# Patient Record
Sex: MALE | Race: WHITE | Employment: OTHER | URBAN - METROPOLITAN AREA
[De-identification: names, ages, dates, MRNs, and addresses within clinical notes are randomized per-mention and may not be internally consistent; named-entity substitution may affect disease eponyms.]

---

## 2022-11-10 ENCOUNTER — HOSPITAL ENCOUNTER (EMERGENCY)
Age: 72
Discharge: HOME | End: 2022-11-10
Payer: MEDICARE

## 2022-11-10 ENCOUNTER — APPOINTMENT (OUTPATIENT)
Dept: GENERAL RADIOLOGY | Age: 72
End: 2022-11-10
Payer: MEDICARE

## 2022-11-10 VITALS
WEIGHT: 295 LBS | SYSTOLIC BLOOD PRESSURE: 147 MMHG | RESPIRATION RATE: 20 BRPM | HEIGHT: 73 IN | HEART RATE: 100 BPM | BODY MASS INDEX: 39.1 KG/M2 | OXYGEN SATURATION: 99 % | DIASTOLIC BLOOD PRESSURE: 90 MMHG | TEMPERATURE: 98.3 F

## 2022-11-10 DIAGNOSIS — J20.9 ACUTE BRONCHITIS, UNSPECIFIED ORGANISM: Primary | ICD-10-CM

## 2022-11-10 DIAGNOSIS — S46.911A SHOULDER STRAIN, RIGHT, INITIAL ENCOUNTER: ICD-10-CM

## 2022-11-10 LAB
INFLUENZA A BY PCR: NOT DETECTED
INFLUENZA B BY PCR: NOT DETECTED

## 2022-11-10 PROCEDURE — 71046 X-RAY EXAM CHEST 2 VIEWS: CPT

## 2022-11-10 PROCEDURE — 87502 INFLUENZA DNA AMP PROBE: CPT

## 2022-11-10 PROCEDURE — 73030 X-RAY EXAM OF SHOULDER: CPT

## 2022-11-10 PROCEDURE — 99211 OFF/OP EST MAY X REQ PHY/QHP: CPT

## 2022-11-10 RX ORDER — OMEPRAZOLE 20 MG/1
20 CAPSULE, DELAYED RELEASE ORAL DAILY
COMMUNITY

## 2022-11-10 RX ORDER — PROPRANOLOL HYDROCHLORIDE 10 MG/1
10 TABLET ORAL 3 TIMES DAILY
COMMUNITY

## 2022-11-10 RX ORDER — TAMSULOSIN HYDROCHLORIDE 0.4 MG/1
0.4 CAPSULE ORAL DAILY
COMMUNITY

## 2022-11-10 RX ORDER — ATORVASTATIN CALCIUM 10 MG/1
10 TABLET, FILM COATED ORAL DAILY
COMMUNITY

## 2022-11-10 RX ORDER — TIZANIDINE 2 MG/1
2 TABLET ORAL EVERY 6 HOURS PRN
COMMUNITY

## 2022-11-10 RX ORDER — HYDROCHLOROTHIAZIDE 12.5 MG/1
CAPSULE, GELATIN COATED ORAL DAILY
COMMUNITY

## 2022-11-10 ASSESSMENT — PAIN DESCRIPTION - LOCATION: LOCATION: SHOULDER

## 2022-11-10 ASSESSMENT — PAIN DESCRIPTION - DESCRIPTORS: DESCRIPTORS: ACHING;DISCOMFORT;TENDER

## 2022-11-10 ASSESSMENT — PAIN DESCRIPTION - ORIENTATION: ORIENTATION: RIGHT

## 2022-11-10 ASSESSMENT — PAIN - FUNCTIONAL ASSESSMENT: PAIN_FUNCTIONAL_ASSESSMENT: 0-10

## 2022-11-10 ASSESSMENT — PAIN SCALES - GENERAL: PAINLEVEL_OUTOF10: 9

## 2022-11-10 NOTE — ED PROVIDER NOTES
[address] [facility]  Department of Emergency Medicine  UC Encounter Note  11/10/22   1:04 PM EST      NAME: Marina Sarabia  :  1950  MRN:  [id number]    Chief Complaint: Cough (States he was coughing so bad on Tuesday that he \"blacked out\" and hit his shoulder) and Shoulder Injury (Right shoulder )      This is a 58-year-old male the presents to urgent care complaining of persistent cough and upper respiratory symptoms for the past couple days. Also, he states several days ago he was coughing so much that he blacked out for a second and fell and hit his right shoulder. He denies any head or neck pain. He states he does not feel dizzy or lightheaded anymore he said it was just a lot of coughing at that 1 point. He denies any abdominal pain nausea vomiting diarrhea or urinary symptoms. On first contact patient he appears to be no acute distress. Review of Systems  Pertinent positives and negatives are stated within HPI, all other systems reviewed and are negative. Physical Exam  Vitals and nursing note reviewed. Constitutional:       Appearance: He is well-developed. HENT:      Head: Normocephalic and atraumatic. Jaw: There is normal jaw occlusion. No trismus. Right Ear: Hearing, ear canal and external ear normal. Tympanic membrane is bulging. Left Ear: Hearing, ear canal and external ear normal. Tympanic membrane is bulging. Nose: Congestion and rhinorrhea present. Right Sinus: No maxillary sinus tenderness or frontal sinus tenderness. Left Sinus: No maxillary sinus tenderness or frontal sinus tenderness. Mouth/Throat:      Pharynx: Uvula midline. No uvula swelling. Eyes:      General: Lids are normal.      Conjunctiva/sclera: Conjunctivae normal.      Pupils: Pupils are equal, round, and reactive to light. Cardiovascular:      Rate and Rhythm: Normal rate and regular rhythm. Heart sounds: Normal heart sounds.  No murmur heard.  Pulmonary:      Effort: Pulmonary effort is normal.      Breath sounds: Normal breath sounds. Chest:      Chest wall: Tenderness present. Abdominal:      General: Bowel sounds are normal.      Palpations: Abdomen is soft. Abdomen is not rigid. Tenderness: There is no abdominal tenderness. There is no guarding or rebound. Musculoskeletal:      Cervical back: Normal range of motion and neck supple. Comments: Patient does have some right shoulder area tenderness to palpation but no bruise no obvious deformity. He does have some mild right clavicle area tenderness to we will get that on the x-ray of the chest.   Skin:     General: Skin is warm and dry. Findings: No abrasion or rash. Neurological:      General: No focal deficit present. Mental Status: He is alert and oriented to person, place, and time. GCS: GCS eye subscore is 4. GCS verbal subscore is 5. GCS motor subscore is 6. Cranial Nerves: Cranial nerves 2-12 are intact. No cranial nerve deficit. Sensory: No sensory deficit. Motor: Motor function is intact. Coordination: Coordination is intact. Coordination normal.      Gait: Gait is intact. Gait normal.       Procedures    MDM  Number of Diagnoses or Management Options  Acute bronchitis, unspecified organism  Shoulder strain, right, initial encounter  Diagnosis management comments: Patient is in no acute distress. He is neurologically intact. Lungs are clear. X-rays were negative. He does have some cough and URI symptoms. Bronchitis symptoms. Shoulder strain. I did speak to him verbally and gave him verbal discharge instructions since our power did go out. I placed him on doxycycline and cough and cold medications. Instructions given verbally given.   I told him he can also look on [facility] to get his discharge information from there as well.             --------------------------------------------- PAST HISTORY ---------------------------------------------  Past Medical History:  has no past medical history on file. Past Surgical History:  has no past surgical history on file. Social History:  reports that he has never smoked. He has never used smokeless tobacco.    Family History: family history is not on file. The patients home medications have been reviewed. Allergies: Ace inhibitors    -------------------------------------------------- RESULTS -------------------------------------------------  No results found for this visit on 11/10/22. XR SHOULDER RIGHT (MIN 2 VIEWS)   Final Result   No acute abnormality. XR CHEST (2 VW)   Final Result   No acute process. ------------------------- NURSING NOTES AND VITALS REVIEWED ---------------------------   The nursing notes within the ED encounter and vital signs as below have been reviewed. BP (!) 147/90   Pulse 100   Temp 98.3 °F (36.8 °C)   Resp 20   Ht 6' 1\" (1.854 m)   Wt 295 lb (133.8 kg)   SpO2 99%   BMI 38.92 kg/m²   Oxygen Saturation Interpretation: Normal      ------------------------------------------ PROGRESS NOTES ------------------------------------------   I have spoken with the patient and discussed todays results, in addition to providing specific details for the plan of care and counseling regarding the diagnosis and prognosis. Their questions are answered at this time and they are agreeable with the plan.      --------------------------------- ADDITIONAL PROVIDER NOTES ---------------------------------     This patient is stable for discharge. I have shared the specific conditions for return, as well as the importance of follow-up. * NOTE: This report was transcribed using voice recognition software.  Every effort was made to ensure accuracy; however, inadvertent computerized transcription errors may be present.    --------------------------------- IMPRESSION AND DISPOSITION ---------------------------------    IMPRESSION  1. Acute bronchitis, unspecified organism    2.  Shoulder strain, right, initial encounter        DISPOSITION  Disposition: Discharge to home  Patient condition is good       Valentine Rodriguez PA-C  11/10/22 1552

## 2025-01-01 ENCOUNTER — HOSPITAL ENCOUNTER (INPATIENT)
Age: 75
LOS: 2 days | Discharge: HOME | DRG: 660 | End: 2025-01-03
Attending: HOSPITALIST | Admitting: HOSPITALIST
Payer: MEDICARE

## 2025-01-01 DIAGNOSIS — N20.0 BILATERAL KIDNEY STONES: ICD-10-CM

## 2025-01-01 PROBLEM — N30.01 ACUTE CYSTITIS WITH HEMATURIA: Status: ACTIVE | Noted: 2025-01-01

## 2025-01-01 PROBLEM — E66.813 CLASS 3 SEVERE OBESITY WITH SERIOUS COMORBIDITY AND BODY MASS INDEX (BMI) OF 40.0 TO 44.9 IN ADULT: Status: ACTIVE | Noted: 2025-01-01

## 2025-01-01 PROBLEM — E66.01 CLASS 3 SEVERE OBESITY WITH SERIOUS COMORBIDITY AND BODY MASS INDEX (BMI) OF 40.0 TO 44.9 IN ADULT: Status: ACTIVE | Noted: 2025-01-01

## 2025-01-01 PROBLEM — I10 PRIMARY HYPERTENSION: Status: ACTIVE | Noted: 2025-01-01

## 2025-01-01 PROBLEM — E78.2 MIXED HYPERLIPIDEMIA: Status: ACTIVE | Noted: 2025-01-01

## 2025-01-01 PROBLEM — K21.9 GASTROESOPHAGEAL REFLUX DISEASE WITHOUT ESOPHAGITIS: Status: ACTIVE | Noted: 2025-01-01

## 2025-01-01 LAB
ANION GAP SERPL CALCULATED.3IONS-SCNC: 11 MMOL/L (ref 7–16)
BUN SERPL-MCNC: 17 MG/DL (ref 6–23)
CALCIUM SERPL-MCNC: 9.5 MG/DL (ref 8.6–10.2)
CHLORIDE SERPL-SCNC: 97 MMOL/L (ref 98–107)
CO2 SERPL-SCNC: 24 MMOL/L (ref 22–29)
CREAT SERPL-MCNC: 1 MG/DL (ref 0.7–1.2)
GFR, ESTIMATED: 81 ML/MIN/1.73M2
GLUCOSE SERPL-MCNC: 164 MG/DL (ref 74–99)
POTASSIUM SERPL-SCNC: 4.5 MMOL/L (ref 3.5–5)
SODIUM SERPL-SCNC: 132 MMOL/L (ref 132–146)

## 2025-01-01 PROCEDURE — 6370000000 HC RX 637 (ALT 250 FOR IP): Performed by: HOSPITALIST

## 2025-01-01 PROCEDURE — 36415 COLL VENOUS BLD VENIPUNCTURE: CPT

## 2025-01-01 PROCEDURE — 99223 1ST HOSP IP/OBS HIGH 75: CPT | Performed by: HOSPITALIST

## 2025-01-01 PROCEDURE — 2580000003 HC RX 258: Performed by: HOSPITALIST

## 2025-01-01 PROCEDURE — 80048 BASIC METABOLIC PNL TOTAL CA: CPT

## 2025-01-01 PROCEDURE — 1200000000 HC SEMI PRIVATE

## 2025-01-01 RX ORDER — DULOXETIN HYDROCHLORIDE 30 MG/1
30 CAPSULE, DELAYED RELEASE ORAL DAILY
Status: DISCONTINUED | OUTPATIENT
Start: 2025-01-02 | End: 2025-01-01

## 2025-01-01 RX ORDER — ACETAMINOPHEN 325 MG/1
650 TABLET ORAL EVERY 6 HOURS PRN
Status: DISCONTINUED | OUTPATIENT
Start: 2025-01-01 | End: 2025-01-03 | Stop reason: HOSPADM

## 2025-01-01 RX ORDER — PREGABALIN 75 MG/1
150 CAPSULE ORAL 2 TIMES DAILY
Status: DISCONTINUED | OUTPATIENT
Start: 2025-01-01 | End: 2025-01-03 | Stop reason: HOSPADM

## 2025-01-01 RX ORDER — DULOXETIN HYDROCHLORIDE 60 MG/1
60 CAPSULE, DELAYED RELEASE ORAL 2 TIMES DAILY
Status: DISCONTINUED | OUTPATIENT
Start: 2025-01-01 | End: 2025-01-03 | Stop reason: HOSPADM

## 2025-01-01 RX ORDER — ONDANSETRON 2 MG/ML
4 INJECTION INTRAMUSCULAR; INTRAVENOUS EVERY 6 HOURS PRN
Status: DISCONTINUED | OUTPATIENT
Start: 2025-01-01 | End: 2025-01-03 | Stop reason: HOSPADM

## 2025-01-01 RX ORDER — PRIMIDONE 50 MG/1
100 TABLET ORAL NIGHTLY
Status: DISCONTINUED | OUTPATIENT
Start: 2025-01-01 | End: 2025-01-01

## 2025-01-01 RX ORDER — OMEGA-3-ACID ETHYL ESTERS 1 G/1
1 CAPSULE, LIQUID FILLED ORAL 2 TIMES DAILY
Status: DISCONTINUED | OUTPATIENT
Start: 2025-01-01 | End: 2025-01-03 | Stop reason: HOSPADM

## 2025-01-01 RX ORDER — AMLODIPINE BESYLATE 10 MG/1
10 TABLET ORAL DAILY
Status: DISCONTINUED | OUTPATIENT
Start: 2025-01-02 | End: 2025-01-03 | Stop reason: HOSPADM

## 2025-01-01 RX ORDER — SODIUM CHLORIDE 0.9 % (FLUSH) 0.9 %
5-40 SYRINGE (ML) INJECTION EVERY 12 HOURS SCHEDULED
Status: DISCONTINUED | OUTPATIENT
Start: 2025-01-01 | End: 2025-01-03 | Stop reason: HOSPADM

## 2025-01-01 RX ORDER — HYDROMORPHONE HYDROCHLORIDE 1 MG/ML
1 INJECTION, SOLUTION INTRAMUSCULAR; INTRAVENOUS; SUBCUTANEOUS EVERY 4 HOURS PRN
Status: DISCONTINUED | OUTPATIENT
Start: 2025-01-01 | End: 2025-01-03 | Stop reason: HOSPADM

## 2025-01-01 RX ORDER — DULOXETIN HYDROCHLORIDE 30 MG/1
60 CAPSULE, DELAYED RELEASE ORAL 2 TIMES DAILY
COMMUNITY

## 2025-01-01 RX ORDER — ACETAMINOPHEN 650 MG/1
650 SUPPOSITORY RECTAL EVERY 6 HOURS PRN
Status: DISCONTINUED | OUTPATIENT
Start: 2025-01-01 | End: 2025-01-03 | Stop reason: HOSPADM

## 2025-01-01 RX ORDER — DAPAGLIFLOZIN 10 MG/1
10 TABLET, FILM COATED ORAL EVERY MORNING
Status: ON HOLD | COMMUNITY
End: 2025-01-01

## 2025-01-01 RX ORDER — TRAZODONE HYDROCHLORIDE 100 MG/1
100 TABLET ORAL NIGHTLY
COMMUNITY

## 2025-01-01 RX ORDER — POTASSIUM CHLORIDE 7.45 MG/ML
10 INJECTION INTRAVENOUS PRN
Status: DISCONTINUED | OUTPATIENT
Start: 2025-01-01 | End: 2025-01-03 | Stop reason: HOSPADM

## 2025-01-01 RX ORDER — SODIUM CHLORIDE 9 MG/ML
INJECTION, SOLUTION INTRAVENOUS CONTINUOUS
Status: ACTIVE | OUTPATIENT
Start: 2025-01-01 | End: 2025-01-02

## 2025-01-01 RX ORDER — ROSUVASTATIN CALCIUM 20 MG/1
40 TABLET, COATED ORAL DAILY
Status: DISCONTINUED | OUTPATIENT
Start: 2025-01-02 | End: 2025-01-03 | Stop reason: HOSPADM

## 2025-01-01 RX ORDER — PREGABALIN 100 MG/1
100 CAPSULE ORAL DAILY
COMMUNITY

## 2025-01-01 RX ORDER — POLYETHYLENE GLYCOL 3350 17 G/17G
17 POWDER, FOR SOLUTION ORAL DAILY PRN
Status: DISCONTINUED | OUTPATIENT
Start: 2025-01-01 | End: 2025-01-03 | Stop reason: HOSPADM

## 2025-01-01 RX ORDER — HYDROCHLOROTHIAZIDE 25 MG/1
25 TABLET ORAL DAILY
Status: DISCONTINUED | OUTPATIENT
Start: 2025-01-02 | End: 2025-01-03 | Stop reason: HOSPADM

## 2025-01-01 RX ORDER — PRIMIDONE 50 MG/1
50 TABLET ORAL NIGHTLY
COMMUNITY

## 2025-01-01 RX ORDER — OXYCODONE HYDROCHLORIDE 5 MG/1
5 TABLET ORAL EVERY 4 HOURS PRN
Status: DISCONTINUED | OUTPATIENT
Start: 2025-01-01 | End: 2025-01-03 | Stop reason: HOSPADM

## 2025-01-01 RX ORDER — TIRZEPATIDE 15 MG/.5ML
15 INJECTION, SOLUTION SUBCUTANEOUS WEEKLY
COMMUNITY

## 2025-01-01 RX ORDER — INSULIN LISPRO 100 [IU]/ML
0-4 INJECTION, SOLUTION INTRAVENOUS; SUBCUTANEOUS
Status: DISCONTINUED | OUTPATIENT
Start: 2025-01-01 | End: 2025-01-03 | Stop reason: HOSPADM

## 2025-01-01 RX ORDER — TADALAFIL 5 MG/1
5 TABLET ORAL DAILY
Status: DISCONTINUED | OUTPATIENT
Start: 2025-01-02 | End: 2025-01-03 | Stop reason: HOSPADM

## 2025-01-01 RX ORDER — ASPIRIN 81 MG/1
81 TABLET, CHEWABLE ORAL DAILY
COMMUNITY

## 2025-01-01 RX ORDER — MAGNESIUM SULFATE IN WATER 40 MG/ML
2000 INJECTION, SOLUTION INTRAVENOUS PRN
Status: DISCONTINUED | OUTPATIENT
Start: 2025-01-01 | End: 2025-01-03 | Stop reason: HOSPADM

## 2025-01-01 RX ORDER — TADALAFIL 5 MG/1
5 TABLET ORAL DAILY
COMMUNITY

## 2025-01-01 RX ORDER — TAMSULOSIN HYDROCHLORIDE 0.4 MG/1
0.4 CAPSULE ORAL DAILY
Status: DISCONTINUED | OUTPATIENT
Start: 2025-01-02 | End: 2025-01-03 | Stop reason: HOSPADM

## 2025-01-01 RX ORDER — GLUCAGON 1 MG/ML
1 KIT INJECTION PRN
Status: DISCONTINUED | OUTPATIENT
Start: 2025-01-01 | End: 2025-01-03 | Stop reason: HOSPADM

## 2025-01-01 RX ORDER — DIPHENHYDRAMINE HCL 25 MG
25 CAPSULE ORAL NIGHTLY PRN
COMMUNITY

## 2025-01-01 RX ORDER — SODIUM CHLORIDE 0.9 % (FLUSH) 0.9 %
5-40 SYRINGE (ML) INJECTION PRN
Status: DISCONTINUED | OUTPATIENT
Start: 2025-01-01 | End: 2025-01-03 | Stop reason: HOSPADM

## 2025-01-01 RX ORDER — PREGABALIN 100 MG/1
100 CAPSULE ORAL DAILY
Status: DISCONTINUED | OUTPATIENT
Start: 2025-01-02 | End: 2025-01-01

## 2025-01-01 RX ORDER — ASPIRIN 81 MG/1
81 TABLET, CHEWABLE ORAL DAILY
Status: DISCONTINUED | OUTPATIENT
Start: 2025-01-02 | End: 2025-01-03 | Stop reason: HOSPADM

## 2025-01-01 RX ORDER — OXYCODONE AND ACETAMINOPHEN 10; 325 MG/1; MG/1
1 TABLET ORAL EVERY 6 HOURS PRN
COMMUNITY

## 2025-01-01 RX ORDER — PANTOPRAZOLE SODIUM 40 MG/1
40 TABLET, DELAYED RELEASE ORAL
Status: DISCONTINUED | OUTPATIENT
Start: 2025-01-02 | End: 2025-01-03 | Stop reason: HOSPADM

## 2025-01-01 RX ORDER — ONDANSETRON 4 MG/1
4 TABLET, ORALLY DISINTEGRATING ORAL EVERY 8 HOURS PRN
Status: DISCONTINUED | OUTPATIENT
Start: 2025-01-01 | End: 2025-01-03 | Stop reason: HOSPADM

## 2025-01-01 RX ORDER — TRAZODONE HYDROCHLORIDE 50 MG/1
100 TABLET, FILM COATED ORAL NIGHTLY PRN
Status: DISCONTINUED | OUTPATIENT
Start: 2025-01-01 | End: 2025-01-03 | Stop reason: HOSPADM

## 2025-01-01 RX ORDER — IBUPROFEN 800 MG/1
800 TABLET, FILM COATED ORAL EVERY 8 HOURS PRN
COMMUNITY

## 2025-01-01 RX ORDER — PRIMIDONE 50 MG/1
150 TABLET ORAL NIGHTLY
Status: DISCONTINUED | OUTPATIENT
Start: 2025-01-02 | End: 2025-01-03 | Stop reason: HOSPADM

## 2025-01-01 RX ORDER — POTASSIUM CHLORIDE 1500 MG/1
40 TABLET, EXTENDED RELEASE ORAL PRN
Status: DISCONTINUED | OUTPATIENT
Start: 2025-01-01 | End: 2025-01-03 | Stop reason: HOSPADM

## 2025-01-01 RX ORDER — ICOSAPENT ETHYL 1 G/1
2 CAPSULE ORAL 2 TIMES DAILY
Status: ON HOLD | COMMUNITY
End: 2025-01-01

## 2025-01-01 RX ORDER — SODIUM CHLORIDE 9 MG/ML
INJECTION, SOLUTION INTRAVENOUS PRN
Status: DISCONTINUED | OUTPATIENT
Start: 2025-01-01 | End: 2025-01-03 | Stop reason: HOSPADM

## 2025-01-01 RX ORDER — DEXTROSE MONOHYDRATE 100 MG/ML
INJECTION, SOLUTION INTRAVENOUS CONTINUOUS PRN
Status: DISCONTINUED | OUTPATIENT
Start: 2025-01-01 | End: 2025-01-03 | Stop reason: HOSPADM

## 2025-01-01 RX ADMIN — ACETAMINOPHEN 650 MG: 325 TABLET ORAL at 23:50

## 2025-01-01 RX ADMIN — SODIUM CHLORIDE: 9 INJECTION, SOLUTION INTRAVENOUS at 23:50

## 2025-01-01 ASSESSMENT — PAIN SCALES - GENERAL: PAINLEVEL_OUTOF10: 0

## 2025-01-02 ENCOUNTER — ANESTHESIA EVENT (OUTPATIENT)
Dept: OPERATING ROOM | Age: 75
DRG: 660 | End: 2025-01-02
Payer: MEDICARE

## 2025-01-02 ENCOUNTER — APPOINTMENT (OUTPATIENT)
Dept: GENERAL RADIOLOGY | Age: 75
DRG: 660 | End: 2025-01-02
Attending: HOSPITALIST
Payer: MEDICARE

## 2025-01-02 ENCOUNTER — ANESTHESIA (OUTPATIENT)
Dept: OPERATING ROOM | Age: 75
DRG: 660 | End: 2025-01-02
Payer: MEDICARE

## 2025-01-02 LAB
25(OH)D3 SERPL-MCNC: 21.6 NG/ML (ref 30–100)
ANION GAP SERPL CALCULATED.3IONS-SCNC: 11 MMOL/L (ref 7–16)
BACTERIA URNS QL MICRO: ABNORMAL
BASOPHILS # BLD: 0.05 K/UL (ref 0–0.2)
BASOPHILS NFR BLD: 0 % (ref 0–2)
BILIRUB UR QL STRIP: NEGATIVE
BNP SERPL-MCNC: 409 PG/ML (ref 0–125)
BUN SERPL-MCNC: 16 MG/DL (ref 6–23)
CALCIUM SERPL-MCNC: 9.8 MG/DL (ref 8.6–10.2)
CHLORIDE SERPL-SCNC: 102 MMOL/L (ref 98–107)
CLARITY UR: ABNORMAL
CO2 SERPL-SCNC: 27 MMOL/L (ref 22–29)
COLOR UR: YELLOW
CREAT SERPL-MCNC: 1.1 MG/DL (ref 0.7–1.2)
EOSINOPHIL # BLD: 0.04 K/UL (ref 0.05–0.5)
EOSINOPHILS RELATIVE PERCENT: 0 % (ref 0–6)
ERYTHROCYTE [DISTWIDTH] IN BLOOD BY AUTOMATED COUNT: 14.3 % (ref 11.5–15)
GFR, ESTIMATED: 72 ML/MIN/1.73M2
GLUCOSE BLD-MCNC: 167 MG/DL (ref 74–99)
GLUCOSE BLD-MCNC: 181 MG/DL (ref 74–99)
GLUCOSE BLD-MCNC: 200 MG/DL (ref 74–99)
GLUCOSE BLD-MCNC: 236 MG/DL (ref 74–99)
GLUCOSE BLD-MCNC: 257 MG/DL (ref 74–99)
GLUCOSE BLD-MCNC: 294 MG/DL (ref 74–99)
GLUCOSE SERPL-MCNC: 172 MG/DL (ref 74–99)
GLUCOSE UR STRIP-MCNC: NEGATIVE MG/DL
HBA1C MFR BLD: 6.8 % (ref 4–5.6)
HCT VFR BLD AUTO: 41.7 % (ref 37–54)
HGB BLD-MCNC: 14 G/DL (ref 12.5–16.5)
HGB UR QL STRIP.AUTO: ABNORMAL
IMM GRANULOCYTES # BLD AUTO: 0.13 K/UL (ref 0–0.58)
IMM GRANULOCYTES NFR BLD: 1 % (ref 0–5)
KETONES UR STRIP-MCNC: NEGATIVE MG/DL
LACTATE BLDV-SCNC: 2.4 MMOL/L (ref 0.5–2.2)
LEUKOCYTE ESTERASE UR QL STRIP: ABNORMAL
LYMPHOCYTES NFR BLD: 1.83 K/UL (ref 1.5–4)
LYMPHOCYTES RELATIVE PERCENT: 13 % (ref 20–42)
MCH RBC QN AUTO: 30.8 PG (ref 26–35)
MCHC RBC AUTO-ENTMCNC: 33.6 G/DL (ref 32–34.5)
MCV RBC AUTO: 91.6 FL (ref 80–99.9)
MONOCYTES NFR BLD: 1.39 K/UL (ref 0.1–0.95)
MONOCYTES NFR BLD: 10 % (ref 2–12)
NEUTROPHILS NFR BLD: 75 % (ref 43–80)
NEUTS SEG NFR BLD: 10.52 K/UL (ref 1.8–7.3)
NITRITE UR QL STRIP: POSITIVE
PH UR STRIP: 6 [PH] (ref 5–9)
PLATELET # BLD AUTO: 203 K/UL (ref 130–450)
PMV BLD AUTO: 8.5 FL (ref 7–12)
POTASSIUM SERPL-SCNC: 4.8 MMOL/L (ref 3.5–5)
PROT UR STRIP-MCNC: 30 MG/DL
PTH-INTACT SERPL-MCNC: 27.9 PG/ML (ref 15–65)
RBC # BLD AUTO: 4.55 M/UL (ref 3.8–5.8)
RBC #/AREA URNS HPF: ABNORMAL /HPF
SODIUM SERPL-SCNC: 140 MMOL/L (ref 132–146)
SP GR UR STRIP: 1.02 (ref 1–1.03)
TSH SERPL DL<=0.05 MIU/L-ACNC: 0.46 UIU/ML (ref 0.27–4.2)
UROBILINOGEN UR STRIP-ACNC: 0.2 EU/DL (ref 0–1)
WBC #/AREA URNS HPF: ABNORMAL /HPF
WBC OTHER # BLD: 14 K/UL (ref 4.5–11.5)

## 2025-01-02 PROCEDURE — 80048 BASIC METABOLIC PNL TOTAL CA: CPT

## 2025-01-02 PROCEDURE — 83605 ASSAY OF LACTIC ACID: CPT

## 2025-01-02 PROCEDURE — 7100000000 HC PACU RECOVERY - FIRST 15 MIN: Performed by: UROLOGY

## 2025-01-02 PROCEDURE — 85025 COMPLETE CBC W/AUTO DIFF WBC: CPT

## 2025-01-02 PROCEDURE — 6370000000 HC RX 637 (ALT 250 FOR IP): Performed by: HOSPITALIST

## 2025-01-02 PROCEDURE — 2709999900 HC NON-CHARGEABLE SUPPLY: Performed by: UROLOGY

## 2025-01-02 PROCEDURE — 97530 THERAPEUTIC ACTIVITIES: CPT

## 2025-01-02 PROCEDURE — 82962 GLUCOSE BLOOD TEST: CPT

## 2025-01-02 PROCEDURE — 7100000001 HC PACU RECOVERY - ADDTL 15 MIN: Performed by: UROLOGY

## 2025-01-02 PROCEDURE — 99232 SBSQ HOSP IP/OBS MODERATE 35: CPT | Performed by: STUDENT IN AN ORGANIZED HEALTH CARE EDUCATION/TRAINING PROGRAM

## 2025-01-02 PROCEDURE — 3700000000 HC ANESTHESIA ATTENDED CARE: Performed by: UROLOGY

## 2025-01-02 PROCEDURE — C1769 GUIDE WIRE: HCPCS | Performed by: UROLOGY

## 2025-01-02 PROCEDURE — 3600000003 HC SURGERY LEVEL 3 BASE: Performed by: UROLOGY

## 2025-01-02 PROCEDURE — 97165 OT EVAL LOW COMPLEX 30 MIN: CPT

## 2025-01-02 PROCEDURE — 87086 URINE CULTURE/COLONY COUNT: CPT

## 2025-01-02 PROCEDURE — 83036 HEMOGLOBIN GLYCOSYLATED A1C: CPT

## 2025-01-02 PROCEDURE — 83880 ASSAY OF NATRIURETIC PEPTIDE: CPT

## 2025-01-02 PROCEDURE — BT141ZZ FLUOROSCOPY OF KIDNEYS, URETERS AND BLADDER USING LOW OSMOLAR CONTRAST: ICD-10-PCS | Performed by: UROLOGY

## 2025-01-02 PROCEDURE — 2580000003 HC RX 258: Performed by: UROLOGY

## 2025-01-02 PROCEDURE — 1200000000 HC SEMI PRIVATE

## 2025-01-02 PROCEDURE — 97535 SELF CARE MNGMENT TRAINING: CPT

## 2025-01-02 PROCEDURE — 83970 ASSAY OF PARATHORMONE: CPT

## 2025-01-02 PROCEDURE — C1758 CATHETER, URETERAL: HCPCS | Performed by: UROLOGY

## 2025-01-02 PROCEDURE — 6360000002 HC RX W HCPCS: Performed by: HOSPITALIST

## 2025-01-02 PROCEDURE — 3600000013 HC SURGERY LEVEL 3 ADDTL 15MIN: Performed by: UROLOGY

## 2025-01-02 PROCEDURE — 82306 VITAMIN D 25 HYDROXY: CPT

## 2025-01-02 PROCEDURE — 6360000002 HC RX W HCPCS: Performed by: NURSE ANESTHETIST, CERTIFIED REGISTERED

## 2025-01-02 PROCEDURE — 81001 URINALYSIS AUTO W/SCOPE: CPT

## 2025-01-02 PROCEDURE — 74420 UROGRAPHY RTRGR +-KUB: CPT

## 2025-01-02 PROCEDURE — 0T768DZ DILATION OF RIGHT URETER WITH INTRALUMINAL DEVICE, VIA NATURAL OR ARTIFICIAL OPENING ENDOSCOPIC: ICD-10-PCS | Performed by: UROLOGY

## 2025-01-02 PROCEDURE — 3700000001 HC ADD 15 MINUTES (ANESTHESIA): Performed by: UROLOGY

## 2025-01-02 PROCEDURE — C2617 STENT, NON-COR, TEM W/O DEL: HCPCS | Performed by: UROLOGY

## 2025-01-02 PROCEDURE — 2500000003 HC RX 250 WO HCPCS: Performed by: HOSPITALIST

## 2025-01-02 PROCEDURE — 36415 COLL VENOUS BLD VENIPUNCTURE: CPT

## 2025-01-02 PROCEDURE — 97161 PT EVAL LOW COMPLEX 20 MIN: CPT

## 2025-01-02 PROCEDURE — 84443 ASSAY THYROID STIM HORMONE: CPT

## 2025-01-02 DEVICE — URETERAL STENT
Type: IMPLANTABLE DEVICE | Site: URETER | Status: FUNCTIONAL
Brand: PERCUFLEX™

## 2025-01-02 RX ORDER — DEXAMETHASONE SODIUM PHOSPHATE 10 MG/ML
INJECTION INTRAMUSCULAR; INTRAVENOUS
Status: DISCONTINUED | OUTPATIENT
Start: 2025-01-02 | End: 2025-01-02 | Stop reason: SDUPTHER

## 2025-01-02 RX ORDER — PROPOFOL 10 MG/ML
INJECTION, EMULSION INTRAVENOUS
Status: DISCONTINUED | OUTPATIENT
Start: 2025-01-02 | End: 2025-01-02 | Stop reason: SDUPTHER

## 2025-01-02 RX ORDER — FENTANYL CITRATE 50 UG/ML
INJECTION, SOLUTION INTRAMUSCULAR; INTRAVENOUS
Status: DISCONTINUED | OUTPATIENT
Start: 2025-01-02 | End: 2025-01-02 | Stop reason: SDUPTHER

## 2025-01-02 RX ORDER — SODIUM CHLORIDE, SODIUM LACTATE, POTASSIUM CHLORIDE, CALCIUM CHLORIDE 600; 310; 30; 20 MG/100ML; MG/100ML; MG/100ML; MG/100ML
INJECTION, SOLUTION INTRAVENOUS CONTINUOUS
Status: DISCONTINUED | OUTPATIENT
Start: 2025-01-02 | End: 2025-01-03 | Stop reason: HOSPADM

## 2025-01-02 RX ORDER — ONDANSETRON 2 MG/ML
INJECTION INTRAMUSCULAR; INTRAVENOUS
Status: DISCONTINUED | OUTPATIENT
Start: 2025-01-02 | End: 2025-01-02 | Stop reason: SDUPTHER

## 2025-01-02 RX ORDER — LIDOCAINE HYDROCHLORIDE 20 MG/ML
INJECTION, SOLUTION INTRAVENOUS
Status: DISCONTINUED | OUTPATIENT
Start: 2025-01-02 | End: 2025-01-02 | Stop reason: SDUPTHER

## 2025-01-02 RX ADMIN — ONDANSETRON 4 MG: 2 INJECTION, SOLUTION INTRAMUSCULAR; INTRAVENOUS at 09:44

## 2025-01-02 RX ADMIN — ACETAMINOPHEN 650 MG: 325 TABLET ORAL at 08:00

## 2025-01-02 RX ADMIN — FENTANYL CITRATE 50 MCG: 50 INJECTION, SOLUTION INTRAMUSCULAR; INTRAVENOUS at 09:49

## 2025-01-02 RX ADMIN — INSULIN LISPRO 2 UNITS: 100 INJECTION, SOLUTION INTRAVENOUS; SUBCUTANEOUS at 17:35

## 2025-01-02 RX ADMIN — PROPOFOL 60 MG: 10 INJECTION, EMULSION INTRAVENOUS at 09:44

## 2025-01-02 RX ADMIN — WATER 1000 MG: 1 INJECTION INTRAMUSCULAR; INTRAVENOUS; SUBCUTANEOUS at 08:05

## 2025-01-02 RX ADMIN — PRIMIDONE 150 MG: 50 TABLET ORAL at 20:01

## 2025-01-02 RX ADMIN — PREGABALIN 150 MG: 75 CAPSULE ORAL at 20:01

## 2025-01-02 RX ADMIN — SODIUM CHLORIDE, POTASSIUM CHLORIDE, SODIUM LACTATE AND CALCIUM CHLORIDE: 600; 310; 30; 20 INJECTION, SOLUTION INTRAVENOUS at 13:20

## 2025-01-02 RX ADMIN — DULOXETINE HYDROCHLORIDE 60 MG: 60 CAPSULE, DELAYED RELEASE ORAL at 20:01

## 2025-01-02 RX ADMIN — ACETAMINOPHEN 650 MG: 325 TABLET ORAL at 01:12

## 2025-01-02 RX ADMIN — INSULIN LISPRO 2 UNITS: 100 INJECTION, SOLUTION INTRAVENOUS; SUBCUTANEOUS at 20:05

## 2025-01-02 RX ADMIN — DEXAMETHASONE SODIUM PHOSPHATE 10 MG: 10 INJECTION INTRAMUSCULAR; INTRAVENOUS at 09:44

## 2025-01-02 RX ADMIN — TIZANIDINE 4 MG: 4 TABLET ORAL at 20:01

## 2025-01-02 RX ADMIN — INSULIN LISPRO 1 UNITS: 100 INJECTION, SOLUTION INTRAVENOUS; SUBCUTANEOUS at 13:36

## 2025-01-02 RX ADMIN — PROPOFOL 50 MCG/KG/MIN: 10 INJECTION, EMULSION INTRAVENOUS at 09:45

## 2025-01-02 RX ADMIN — OMEGA-3-ACID ETHYL ESTERS CAPSULES 1 G: 1 CAPSULE, LIQUID FILLED ORAL at 20:01

## 2025-01-02 RX ADMIN — FENTANYL CITRATE 50 MCG: 50 INJECTION, SOLUTION INTRAMUSCULAR; INTRAVENOUS at 09:44

## 2025-01-02 RX ADMIN — LIDOCAINE HYDROCHLORIDE 100 MG: 20 INJECTION, SOLUTION INTRAVENOUS at 09:44

## 2025-01-02 RX ADMIN — TIZANIDINE 4 MG: 4 TABLET ORAL at 13:36

## 2025-01-02 ASSESSMENT — PAIN SCALES - GENERAL
PAINLEVEL_OUTOF10: 0

## 2025-01-02 NOTE — CONSULTS
1/2/2025 8:28 AM  Brian Patel  [id number]     Chief Complaint:    Right distal ureteral stone      History of Present Illness:      The patient is a 74 y.o. male patient who initially presented to [facility] ER yesterday. He was found to have a right distal stone according to the ER physician's note. He was transferred from [place] to [place] yesterday. Urology was not notified until 804 this morning. He has been febrile since last night. He does have leukocytosis with a WBC of 14. Lactic acid this morning was 2.4. No CT report or disc was sent from [place]. He does report he was having right flank pain. He has not passed the stone to his knowledge. He does have a history of kidney stones. He typically follows with the [facility] at [facility], [place], operated by [facility]. He also has a history of BPH and takes Flomax.  He is here visiting his girlfriend.     No past medical history on file.      No past surgical history on file.    Medications Prior to Admission:    Medications Prior to Admission: aspirin 81 MG chewable tablet, Take 1 tablet by mouth daily  vitamin D 25 MCG (1000 UT) CAPS, Take 4 capsules by mouth daily  diphenhydrAMINE (BENADRYL) 25 MG capsule, Take 1 capsule by mouth nightly as needed for Sleep  DULoxetine (CYMBALTA) 30 MG extended release capsule, Take 2 capsules by mouth 2 times daily  pregabalin (LYRICA) 100 MG capsule, Take 1 capsule by mouth daily. Max Daily Amount: 100 mg  primidone (MYSOLINE) 50 MG tablet, Take 1 tablet by mouth nightly  traZODone (DESYREL) 100 MG tablet, Take 1 tablet by mouth nightly  metFORMIN (GLUCOPHAGE) 500 MG tablet, Take 1 tablet by mouth 2 times daily (with meals)  Rosuvastatin Calcium 40 MG CPSP, Take 40 mg by mouth daily  tadalafil (CIALIS) 5 MG tablet, Take 1 tablet by mouth Daily  linaclotide (LINZESS) 145 MCG capsule, Take 1 capsule by mouth every morning (before breakfast)  brexpiprazole (REXULTI) 2 MG TABS tablet, Take 1 tablet by mouth  Wt (!) 138.8 kg (306 lb)   SpO2 100%   BMI 40.37 kg/m²     General:  Awake, alert, oriented X 3. No apparent distress.  HEENT:  Normocephalic, atraumatic.  Lungs:  Respirations symmetric and non-labored.  Abdomen:  soft, nontender, no masses  Extremities:  No clubbing, cyanosis, or edema  Skin:  Warm and dry, no open lesions or rashes  Neuro: There are no motor or sensory deficits in the 4 quadrant extremities   Rectal: deferred  Genitourinary:  no grewal     Labs:     Recent Labs     01/02/25  0655   WBC 14.0*   RBC 4.55   HGB 14.0   HCT 41.7   MCV 91.6   MCH 30.8   MCHC 33.6   RDW 14.3      MPV 8.5         Recent Labs     01/01/25  2303 01/02/25  0655   CREATININE 1.0 1.1       No results found for: \"PSA\"    Imaging:     No imaging or reports sent from [place]. Per review of the MDM note from ER at [place] he had a 7mm right distal ureteral stone with multiple bilateral nonobstructing stones    Assessment/plan:  7mm right distal ureteral stone   Right hydronephrosis   Bilateral nonobstructing stones   BPH   UTI    UA reviewed, positive for infection   Urine culture ordered   Continue to watch the WBCs  CT imaging from [place] was not sent, no disc or report. Review of the ER doctor note states the patient had a 7mm right distal stone with right hydronephrosis and multiple nonobstructing stones.   He has been febrile since last night  Urology was contacted this AM  We will keep NPO and proceed to OR STAT for cystoscopy, bilateral retrograde pyelogram, right stent insertion   He consents to the above   Continue the antibiotics, on Rocephin   Will follow         Electronically signed by ISABELLA Burrows CNP on 1/2/2025 at 8:28 AM  [facility]   Agree with above assessment and plan  Will do cysto today

## 2025-01-02 NOTE — PROGRESS NOTES
Brian Patel was ordered Linzess and tadalafil 5mg which is a nonformulary medication.  This medication will need to be supplied by the patient as the pharmacy does not carry this non-formulary medication.    If the medication has not been administered by 1400 on the following day from the time the order was placed, a pharmacist will follow-up with the nurse of the patient to assess the capability of the patient to bring in the medication.    If it is determined that the patient cannot supply the medication and it is not available to be dispensed from the pharmacy, the provider will be notified.

## 2025-01-02 NOTE — PROGRESS NOTES
Occupational Therapy    OCCUPATIONAL THERAPY INITIAL EVALUATION    [facility]  [address] [place], [place]        Date:2025                                                  Patient Name: Brian Patel    MRN: [id number]    : 1950    Room: [address] [place] [address]          Evaluating OT: Carol Hayward, MIGUEL ANGELD, OTR/L; [id number]      Occupational therapy physician order:   Start   Ordering Provider    25  OT eval and treat  Start:  25,   End:  25,   ONE TIME,   Standing Count:  1 Occurrences,   R         John Hernandez MD          Pt presents to ED with flank pain and was found to have kidney stone       Diagnosis:    1. Bilateral kidney stones       Patient Active Problem List   Diagnosis    Kidney stone    Primary hypertension    Mixed hyperlipidemia    Gastroesophageal reflux disease without esophagitis    Class 3 severe obesity with serious comorbidity and body mass index (BMI) of 40.0 to 44.9 in adult    Acute cystitis with hematuria          Pertinent Medical History: No past medical history on file.       Surgery/Procedures: 24 Cystopanendoscopy, retrograde pyelogram, stent placement.        Recommended Adaptive Equipment: TBD        Precautions:  Fall Risk  Pt preferred name:\"Zane\"   Assessment of current deficits    [x] Functional mobility  [x]ADLs  [x] Strength               [x]Cognition    [x] Functional transfers   [x] IADLs         [x] Safety Awareness   [x]Endurance    [x] Fine Coordination              [x] Balance      [] Vision/perception   []Sensation     []Gross Motor Coordination  [] ROM  [] Delirium                   [] Motor Control     OT PLAN OF CARE   OT POC based on physician orders, patient diagnosis and results of clinical assessment    Frequency/Duration 1-3 days/wk for 2 weeks PRN   Specific OT Treatment Interventions to include:   * Instruction/training on adapted ADL techniques and AE  Initial Eval Status  Date: 1/2/2025  Treatment Status  Date: STGs = LTGs  Time frame: 10-14 days   Feeding Set up    Mod I      Grooming Stand by assist   Standing at sink to wash hands   Mod I      UB Dressing Min A   Seated eob   Mod I      LB Dressing Min A     Mod I      Bathing Mod A     Mod I      Toileting Min A   Seated to urinate, alesha hygiene seated   Mod I      Bed Mobility  Rolling L/R: Stand by assist    Supine to sit: Stand by assist    Sit to supine: Stand by assist    Supine to sit: Mod I    Sit to supine: Mod I     Functional Transfers Sit to stand: Stand by assist    Stand to sit: Stand by assist    Stand pivot: Stand by assist    Sit to stand: Mod I    Stand to sit: Mod I    Stand pivot: Mod I    Functional Mobility Stand by assist  With no AD  in the room  and fisher  in order to increase activity tolerance and strength for increased independence in ADLs and IADLs   Mod I     Balance Sitting:    Static: Supervision     Dynamic: Stand by assist    Standing: Stand by assist    Sitting:    Static: Mod I     Dynamic: Mod I    Standing: Mod I     Activity Tolerance Fair +    Good     Visual/  Perceptual  WFL        Safety Fair +  Good during ADL completion   Vitals HR and SPO2 stable throughout session         Hand Dominance right    AROM (PROM) Strength /FMC Goal: (PRN)   RUE   WFL    grossly functional good  and wfl FMC/dexterity noted during ADL tasks      LUE WFL    grossly functional good  and wfl FMC/dexterity noted during ADL tasks          Fine Motor Coordination:  WFL  Hearing: WFL   Sensation:  no c/o numbness or tingling    Tone:  WFL    Edema: unremarkable      Comment: RN cleared patient for OT.  Upon arrival, patient was supine in bed and agreeable to OT session.  Significant other present throughout session . At end of session, patient sitting in chair with arms  and Rn aware ; with call light and phone within reach; all lines and tubes intact.   Patient presents with

## 2025-01-02 NOTE — PROGRESS NOTES
[facility]   IN-PATIENT SERVICE      Progress Note    1/2/2025    1:50 PM    Name:   Brian Patel  MRN:     [id number]     Acct:      [id number]   Room:   [address] [place], [place] [address]   Day:  1  Admit Date:  1/1/2025  9:09 PM    PCP:   No primary care provider on file.  Code Status:  Full Code    Subjective:     C/C: No chief complaint on file.  left kidney stone   Interval History Status: improved.     S/p CYSTOSCOPY BILATERAL RETROGRADE PYELOGRAM RIGHT STENT INSERTION  1/2/2025    Brief History:     Pt presented to [facility] ED for evaluation of flank pain.  Workup in the ED found a 7 mm kidney stone and U/A positive for UTI.  Patient is transferred to [facility] for urology consult.   Patient arrived hemodynamic stable.  Pt was seen on room air, reports right lower back/flank pain started today.  He had kidney stone a few years ago required procedure.   He is talking normal with his though processing appears to be slow.  He reports he just feels tired and sleepy.  Per RN, girlfriend reports he has been like this recently.  No acute fever, chills, pain, N/V/abd pain.        Review of Systems:     Constitutional:  negative for chills, fevers, sweats  Respiratory:  negative for cough, dyspnea on exertion, shortness of breath, wheezing  Cardiovascular:  negative for chest pain, chest pressure/discomfort, lower extremity edema, palpitations  Gastrointestinal:  negative for abdominal pain, constipation, diarrhea, nausea, vomiting  Neurological:  negative for dizziness, headache    Medications:     Allergies:    Allergies   Allergen Reactions    Ace Inhibitors        Current Meds:   Scheduled Meds:    aspirin  81 mg Oral Daily    hydroCHLOROthiazide  25 mg Oral Daily    omega-3 acid ethyl esters  1 g Oral BID    pantoprazole  40 mg Oral QAM AC    tamsulosin  0.4 mg Oral Daily    tiZANidine  4 mg Oral TID    sodium chloride flush  5-40 mL IntraVENous 2 times per day    amLODIPine  10 mg Oral Daily

## 2025-01-02 NOTE — ANESTHESIA PRE PROCEDURE
0741 01/02/25 0759 01/02/25 0915   BP:  (!) 138/95  120/83   Pulse: (!) 123 (!) 137 (!) 138 (!) 135   Resp:  22  20   Temp: 37.6 °C (99.7 °F) 37.4 °C (99.3 °F) 38 °C (100.4 °F) (!) 38.5 °C (101.3 °F)   TempSrc: Axillary Oral Oral Temporal   SpO2:  100%  94%   Weight:       Height:                                                  BP Readings from Last 3 Encounters:   01/02/25 120/83   11/10/22 (!) 147/90       NPO Status:                                                                                 BMI:   Wt Readings from Last 3 Encounters:   01/01/25 (!) 138.8 kg (306 lb)   11/10/22 133.8 kg (295 lb)     Body mass index is 40.37 kg/m².    CBC:   Lab Results   Component Value Date/Time    WBC 14.0 01/02/2025 06:55 AM    RBC 4.55 01/02/2025 06:55 AM    HGB 14.0 01/02/2025 06:55 AM    HCT 41.7 01/02/2025 06:55 AM    MCV 91.6 01/02/2025 06:55 AM    RDW 14.3 01/02/2025 06:55 AM     01/02/2025 06:55 AM       CMP:   Lab Results   Component Value Date/Time     01/02/2025 06:55 AM    K 4.8 01/02/2025 06:55 AM     01/02/2025 06:55 AM    CO2 27 01/02/2025 06:55 AM    BUN 16 01/02/2025 06:55 AM    CREATININE 1.1 01/02/2025 06:55 AM    LABGLOM 72 01/02/2025 06:55 AM    GLUCOSE 172 01/02/2025 06:55 AM    CALCIUM 9.8 01/02/2025 06:55 AM       POC Tests:   Recent Labs     01/02/25  0540   POCGLU 167*       Coags: No results found for: \"PROTIME\", \"INR\", \"APTT\"    HCG (If Applicable): No results found for: \"PREGTESTUR\", \"PREGSERUM\", \"HCG\", \"HCGQUANT\"     ABGs: No results found for: \"PHART\", \"PO2ART\", \"HVR6FMB\", \"UIJ2XCT\", \"BEART\", \"G0SNBOCL\"     Type & Screen (If Applicable):  No results found for: \"ABORH\", \"LABANTI\"    Drug/Infectious Status (If Applicable):  No results found for: \"HIV\", \"HEPCAB\"    COVID-19 Screening (If Applicable): No results found for: \"COVID19\"        Anesthesia Evaluation     no history of anesthetic complications:   Airway: Mallampati: III  TM distance: <3 FB   Neck ROM: full  Mouth

## 2025-01-02 NOTE — PROGRESS NOTES
The patient was unable to communicate.  I offered a moment of silent presence and prayer.  If you need further ministry, please call us at [phone].    [facility],    Rev. Jeanmarie Arndt MA,

## 2025-01-02 NOTE — PROGRESS NOTES
Pt verified using 2 Identifiers and ID band with OR staff prior to acceptance to PACU/Phase II care.

## 2025-01-02 NOTE — CARE COORDINATION
Spoke with patient with significant other at bedside. Patient at home, live in [place], [place]. He is currently staying with significant other. They have been in a relationship for several years. Patient is going to stay with significant other, Jaye in [place] until fully recovered. PCP is Dr. Painting in [place], goes by \"Dr. Barragan\". Currently on oxygen, does not wear home oxygen. He does not use assistive devices, normally drives. He plans to return home with no needs when released. Cysto with urology earlier today. No history of having a grewal at home.     Case Management Assessment  Initial Evaluation    Date/Time of Evaluation: 1/2/2025 12:33 PM  Assessment Completed by: DAVE Wing    If patient is discharged prior to next notation, then this note serves as note for discharge by case management.    Patient Name: Brian Patel                   YOB: 1950  Diagnosis: Kidney stone [N20.0]                   Date / Time: 1/1/2025  9:09 PM    Patient Admission Status: Inpatient   Readmission Risk (Low < 19, Mod (19-27), High > 27): Readmission Risk Score: 9.2    Current PCP: No primary care provider on file.  PCP verified by ? Yes    Chart Reviewed: Yes      History Provided by: Patient  Patient Orientation: Alert and Oriented    Patient Cognition: Alert    Hospitalization in the last 30 days (Readmission):  No    If yes, Readmission Assessment in  Navigator will be completed.    Advance Directives:      Code Status: Full Code   Patient's Primary Decision Maker is: Legal Next of Kin    Primary Decision Maker: jaye ziegler - Girlfriend - [phone]    Discharge Planning:    Patient lives with: Alone Type of Home: House  Primary Care Giver: Self  Patient Support Systems include: Spouse/Significant Other   Current Financial resources:    Current community resources:    Current services prior to admission: None            Current DME:              Type of Home Care services:

## 2025-01-02 NOTE — PROGRESS NOTES
4 Eyes Skin Assessment     NAME:  Brian Patel  YOB: 1950  MEDICAL RECORD NUMBER:  [id number]    The patient is being assessed for  Admission    I agree that at least one RN has performed a thorough Head to Toe Skin Assessment on the patient. ALL assessment sites listed below have been assessed.      Areas assessed by both nurses:    Head, Face, Ears, Shoulders, Back, Chest, Arms, Elbows, Hands, Sacrum. Buttock, Coccyx, Ischium, Legs. Feet and Heels, and Under Medical Devices         Does the Patient have a Wound? No noted wound(s)       Cody Prevention initiated by RN: No  Wound Care Orders initiated by RN: No    Pressure Injury (Stage 3,4, Unstageable, DTI, NWPT, and Complex wounds) if present, place Wound referral order by RN under : No    New Ostomies, if present place, Ostomy referral order under : No     Nurse 1 eSignature: Electronically signed by Charlee Tavera RN on 1/1/25 at 10:15 PM EST    **SHARE this note so that the co-signing nurse can place an eSignature**    Nurse 2 eSignature: Electronically signed by Colleen Limon on 1/2/25 at 9:26 PM EST

## 2025-01-02 NOTE — ACP (ADVANCE CARE PLANNING)
Advance Care Planning   Healthcare Decision Maker:    Primary Decision Maker: bora zieglerricia - Girlfriend - [phone]    Click here to complete Healthcare Decision Makers including selection of the Healthcare Decision Maker Relationship (ie \"Primary\").             
[facility]  Pulmonary Rehab  [address] [place] [address]  Phone: [phone]  Fax:   Follow Up Time: Routine

## 2025-01-02 NOTE — ANESTHESIA POSTPROCEDURE EVALUATION
Department of Anesthesiology  Postprocedure Note    Patient: Brian Patel  MRN: [id number]  YOB: 1950  Date of evaluation: 1/2/2025    Procedure Summary       Date: 01/02/25 Room / Location: [address]    Anesthesia Start: 0938 Anesthesia Stop:     Procedure: CYSTOSCOPY RETROGRADE PYELOGRAM RIGHT STENT INSERTION (Right: Bladder) Diagnosis:       Bilateral kidney stones      (Bilateral kidney stones [N20.0])    Surgeons: Igor Montoya MD Responsible Provider: Vamsi De Guzman MD    Anesthesia Type: MAC ASA Status: 3 - Emergent            Anesthesia Type: No value filed.    Jessika Phase I:      Jessika Phase II:      Anesthesia Post Evaluation    Patient location during evaluation: PACU  Patient participation: complete - patient participated  Level of consciousness: awake  Pain score: 3  Airway patency: patent  Nausea & Vomiting: no nausea and no vomiting  Cardiovascular status: blood pressure returned to baseline  Respiratory status: acceptable  Hydration status: euvolemic    No notable events documented.

## 2025-01-02 NOTE — OP NOTE
[facility]              [address] [place] [address]                            OPERATIVE REPORT      PATIENT NAME: ACACIA WESTBROOK               : 1950  MED REC NO: [id number]                        ROOM: [facility]  ACCOUNT NO: [id number]                       ADMIT DATE: 2025  PROVIDER: Madhu Carlson MD      DATE OF PROCEDURE:  2025    SURGEON:  Madhu Carlson MD    PREOPERATIVE DIAGNOSES:  Left ureteral obstruction with urinary tract infection and sepsis.    POSTOPERATIVE DIAGNOSES:  Left ureteral obstruction with urinary tract infection and sepsis.    OPERATIONS:  Cystopanendoscopy, retrograde pyelogram, stent placement.    ANESTHESIA:  LMAC.    ESTIMATED BLOOD LOSS:  Less than 50.    DESCRIPTION OF PROCEDURE:  With the patient in the lithotomy position, under satisfactory sedation, the genitalia were prepped and draped in a sterile manner.  A 21-Burkinan panendoscope passed easily through the pendulous and membranous urethra.  There were no strictures or lesions seen.  The prostatic fossa showed early trilobar development.  Upon entering the bladder, the trigone was symmetrical.  The ureteral orifices of normal configuration and location.  The bladder was 2+ trabeculated.  Mucosal pattern was normal throughout.  Following inspection, retrograde pyelogram revealed an obstruction in the distal ureter.  Stone was not readily seen.  I was able to bypass the obstruction with a wire, following which, a 28 cm 6-Burkinan double-J stent was placed, one end curled in the renal pelvis, the other in the bladder.  Retrograde on the left side showed some hydroureter, but there was no evidence of obstruction.  Bladder was drained.  The patient was sent to the recovery room in satisfactory condition.          MADHU CARLSON MD      D:  2025 10:14:58     T:  2025 10:23:51     LENO/CIERRA  Job #:  [id number]     Doc#:  [id number]

## 2025-01-02 NOTE — BRIEF OP NOTE
Brief Postoperative Note      Patient: Brian Patel  YOB: 1950  MRN: [id number]    Date of Procedure: 1/2/2025    Pre-Op Diagnosis Codes:      * Bilateral kidney stones [N20.0]    Post-Op Diagnosis: Same       Procedure(s):  CYSTOSCOPY BILATERAL RETROGRADE PYELOGRAM RIGHT STENT INSERTION    Surgeon(s):  Igor Montoya MD    Assistant:      Anesthesia: Monitor Anesthesia Care    Estimated Blood Loss (mL): less than 50     Complications: None    Specimens:   ID Type Source Tests Collected by Time Destination   1 : URINE FOR CULTURE Urine Urine, Cystoscopic CULTURE, URINE Igor Montoya MD 1/2/2025 0956        Implants:        Drains:   Ureteral Drain/Stent 01/02/25 Right Ureter (Active)       Findings:  Infection Present At Time Of Surgery (PATOS) (choose all levels that have infection present):  No infection present  Other Findings:     Electronically signed by Igor Montoya MD on 1/2/2025 at 10:08 AM

## 2025-01-02 NOTE — H&P
(CYMBALTA) 30 MG extended release capsule Take 2 capsules by mouth 2 times daily   Yes Cuauhtemoc Edwards MD   pregabalin (LYRICA) 100 MG capsule Take 1 capsule by mouth daily. Max Daily Amount: 100 mg   Yes Cuauhtemoc Edwards MD   primidone (MYSOLINE) 50 MG tablet Take 1 tablet by mouth nightly   Yes Cuauhtemoc Edwadrs MD   traZODone (DESYREL) 100 MG tablet Take 1 tablet by mouth nightly   Yes Cuauhtemoc Edwards MD   metFORMIN (GLUCOPHAGE) 500 MG tablet Take 1 tablet by mouth 2 times daily (with meals)   Yes Cuauhtemoc Edwards MD   Rosuvastatin Calcium 40 MG CPSP Take 40 mg by mouth daily   Yes Cuauhtemoc Edwards MD   tadalafil (CIALIS) 5 MG tablet Take 1 tablet by mouth Daily   Yes Cuauhtemoc Edwards MD   linaclotide (LINZESS) 145 MCG capsule Take 1 capsule by mouth every morning (before breakfast)   Yes Cuauhtemoc Edwards MD   brexpiprazole (REXULTI) 2 MG TABS tablet Take 1 tablet by mouth daily   Yes Cuauhtemoc Edwards MD   ibuprofen (ADVIL;MOTRIN) 800 MG tablet Take 1 tablet by mouth every 8 hours as needed for Pain   Yes Cuauhtemoc Edwards MD   oxyCODONE-acetaminophen (PERCOCET)  MG per tablet Take 1 tablet by mouth every 6 hours as needed for Pain. Max Daily Amount: 4 tablets   Yes Cuauhtemoc Edwards MD   Tirzepatide (MOUNJARO) 15 MG/0.5ML SOAJ Inject 15 mg into the skin once a week thursdays   Yes Cuauhtemoc Edwards MD   LOSARTAN POTASSIUM PO Take 100 mg by mouth daily    Cuauhtemoc Edwards MD   hydroCHLOROthiazide (MICROZIDE) 12.5 MG capsule Take 2 capsules by mouth daily    Cuauhtemoc Edwards MD   omeprazole (PRILOSEC) 20 MG delayed release capsule Take 2 capsules by mouth daily    Cuauhtemoc Edwards MD   AMLODIPINE BENZOATE PO Take 10 mg by mouth daily    Cuauhtemoc Edwards MD   tiZANidine (ZANAFLEX) 2 MG tablet Take 2 tablets by mouth in the morning, at noon, and at bedtime    Cuauhtemoc Edwards MD   tamsulosin (FLOMAX) 0.4 MG capsule Take 0.4 mg  software. Every effort was made to ensure accuracy; however, inadvertent computerized transcription errors may be present.

## 2025-01-02 NOTE — PROGRESS NOTES
Physical Therapy  Physical Therapy Initial Assessment     Name: Brian Patel  : 1950  MRN: [id number]      Date of Service: 2025    Evaluating PT:  Alicia Alfaro PT, DPT [id number]    Room #:  8401/8401-A  Diagnosis:  Kidney stone [N20.0]  PMHx/PSHx:   has no past medical history on file.  Procedure/Surgery:   CYSTOSCOPY RETROGRADE PYELOGRAM RIGHT STENT INSERTION (Right: Bladder) (25)  Precautions:  Fall risk, goes by \"Zane\"  Equipment Needs:  TBD    SUBJECTIVE:    Pt lives in [place], but is planning to discharge to his S/O's home. They will be staying in a 1 story home with 1 step to enter and no rail(s). Pt ambulated with no device PTA. Pt's S/O reports that she owns multiple walkers and canes.    OBJECTIVE:   Initial Evaluation  Date: 24 Treatment Date:  Short Term/ Long Term   Goals   AM-PAC 6 Clicks      Was pt agreeable to Eval/treatment? Yes     Does pt have pain? Denied     Bed Mobility  Rolling: NT  Supine to sit: SBA  Sit to supine: NT  Scooting: SBA  Rolling: Independent  Supine to sit: Independent  Sit to supine: Independent  Scooting: Independent   Transfers Sit to stand: SBA  Stand to sit: SBA  Stand pivot: SBA with no device  Sit to stand: Independent  Stand to sit: Independent  Stand pivot: Independent   Ambulation    400 feet with no device and SBA  >600 feet with AAD and Mod I   Stair negotiation: ascended and descended  NT  >1 steps with no rail and Independent   ROM BUE:  Refer to OT  BLE:  WFL     Strength BUE:  Refer to OT  BLE:  5/5 knee extension, 5/5 ankle DF, 5/5 ankle PF     Balance Sitting EOB:  SBA  Dynamic Standing:  SBA with no device  Sitting EOB:  Independent  Dynamic Standing:  Mod I with AAD     Pt is A & O x 4  Sensation:  Pt reports chronic bilateral peripheral neuropathy  Edema:  Unremarkable   Vitals:   SpO2: 92-96% on RA during mobility  HR: 110-124 during mobility    Therapeutic Exercises:  Dynamic balance activities while sitting EOB for ~10  minutes, gait training, transfer training      Patient education  Pt educated on role of PT in acute setting, benefits of upright mobility, use of call light in room, safety.    Patient response to education:   Pt verbalized understanding Pt demonstrated skill Pt requires further education in this area   Yes Partially  Reinforce     ASSESSMENT:    Conditions Requiring Skilled Therapeutic Intervention:    [x]Decreased strength     [x]Decreased ROM  [x]Decreased functional mobility  [x]Decreased balance   [x]Decreased endurance   [x]Decreased posture  [x]Decreased sensation  []Decreased coordination   []Decreased vision  [x]Decreased safety awareness   [x]Increased pain       Comments:  Pt was supine in bed upon therapist arrival, agreeable to session. Pt was very cooperative. Patient participated in mobility as documented above. Bed mobility and transfers all revealed good sequencing and safety. Good trunk control was appreciated while pt participated in ~10 minutes of balance activities at EOB with dynamic use of BUEs and BLEs. Ambulation revealed a mildly slow gait speed and guarded posture, but no overt LOB was noted. After session, patient was assisted to the chair and was left upright with all needs met, questions answered, and call light within reach.     Treatment:  Patient practiced and was instructed in the following treatment:    Bed mobility - Cues provided for sequencing, physical assist provided as needed.  Transfers - Cues provided for safety, hand/feet placement, physical assist provided as needed.  Ambulation - Cues provided for negotiation of obstacles, physical assist provided as needed.  Skilled positioning - To prevent skin breakdown and contractures.  Skilled assessment of vitals.  Education - Provided for interpretation of vitals, safety, role of PT in acute setting, benefits of upright mobility.    Pt's/ family goals   1. Return to PLOF    Prognosis is Good for reaching above PT

## 2025-01-03 VITALS
SYSTOLIC BLOOD PRESSURE: 132 MMHG | RESPIRATION RATE: 18 BRPM | BODY MASS INDEX: 40.56 KG/M2 | OXYGEN SATURATION: 94 % | HEIGHT: 73 IN | TEMPERATURE: 97.7 F | DIASTOLIC BLOOD PRESSURE: 77 MMHG | WEIGHT: 306 LBS | HEART RATE: 93 BPM

## 2025-01-03 LAB
ANION GAP SERPL CALCULATED.3IONS-SCNC: 11 MMOL/L (ref 7–16)
BASOPHILS # BLD: 0.02 K/UL (ref 0–0.2)
BASOPHILS NFR BLD: 0 % (ref 0–2)
BUN SERPL-MCNC: 15 MG/DL (ref 6–23)
CALCIUM SERPL-MCNC: 9.7 MG/DL (ref 8.6–10.2)
CHLORIDE SERPL-SCNC: 102 MMOL/L (ref 98–107)
CO2 SERPL-SCNC: 27 MMOL/L (ref 22–29)
CREAT SERPL-MCNC: 0.9 MG/DL (ref 0.7–1.2)
EOSINOPHIL # BLD: 0 K/UL (ref 0.05–0.5)
EOSINOPHILS RELATIVE PERCENT: 0 % (ref 0–6)
ERYTHROCYTE [DISTWIDTH] IN BLOOD BY AUTOMATED COUNT: 13.9 % (ref 11.5–15)
GFR, ESTIMATED: >90 ML/MIN/1.73M2
GLUCOSE BLD-MCNC: 166 MG/DL (ref 74–99)
GLUCOSE BLD-MCNC: 216 MG/DL (ref 74–99)
GLUCOSE SERPL-MCNC: 157 MG/DL (ref 74–99)
HCT VFR BLD AUTO: 35.8 % (ref 37–54)
HGB BLD-MCNC: 12.3 G/DL (ref 12.5–16.5)
IMM GRANULOCYTES # BLD AUTO: 0.12 K/UL (ref 0–0.58)
IMM GRANULOCYTES NFR BLD: 1 % (ref 0–5)
LYMPHOCYTES NFR BLD: 1.38 K/UL (ref 1.5–4)
LYMPHOCYTES RELATIVE PERCENT: 12 % (ref 20–42)
MCH RBC QN AUTO: 30.7 PG (ref 26–35)
MCHC RBC AUTO-ENTMCNC: 34.4 G/DL (ref 32–34.5)
MCV RBC AUTO: 89.3 FL (ref 80–99.9)
MICROORGANISM SPEC CULT: NO GROWTH
MICROORGANISM SPEC CULT: NO GROWTH
MONOCYTES NFR BLD: 1.11 K/UL (ref 0.1–0.95)
MONOCYTES NFR BLD: 9 % (ref 2–12)
NEUTROPHILS NFR BLD: 78 % (ref 43–80)
NEUTS SEG NFR BLD: 9.37 K/UL (ref 1.8–7.3)
PLATELET # BLD AUTO: 178 K/UL (ref 130–450)
PMV BLD AUTO: 8.5 FL (ref 7–12)
POTASSIUM SERPL-SCNC: 4 MMOL/L (ref 3.5–5)
RBC # BLD AUTO: 4.01 M/UL (ref 3.8–5.8)
SERVICE CMNT-IMP: NORMAL
SERVICE CMNT-IMP: NORMAL
SODIUM SERPL-SCNC: 140 MMOL/L (ref 132–146)
SPECIMEN DESCRIPTION: NORMAL
SPECIMEN DESCRIPTION: NORMAL
WBC OTHER # BLD: 12 K/UL (ref 4.5–11.5)

## 2025-01-03 PROCEDURE — 6370000000 HC RX 637 (ALT 250 FOR IP): Performed by: HOSPITALIST

## 2025-01-03 PROCEDURE — 36415 COLL VENOUS BLD VENIPUNCTURE: CPT

## 2025-01-03 PROCEDURE — 85025 COMPLETE CBC W/AUTO DIFF WBC: CPT

## 2025-01-03 PROCEDURE — 99239 HOSP IP/OBS DSCHRG MGMT >30: CPT | Performed by: STUDENT IN AN ORGANIZED HEALTH CARE EDUCATION/TRAINING PROGRAM

## 2025-01-03 PROCEDURE — 6360000002 HC RX W HCPCS: Performed by: HOSPITALIST

## 2025-01-03 PROCEDURE — 2500000003 HC RX 250 WO HCPCS: Performed by: HOSPITALIST

## 2025-01-03 PROCEDURE — 82962 GLUCOSE BLOOD TEST: CPT

## 2025-01-03 PROCEDURE — 80048 BASIC METABOLIC PNL TOTAL CA: CPT

## 2025-01-03 RX ORDER — CEFDINIR 300 MG/1
300 CAPSULE ORAL 2 TIMES DAILY
Qty: 10 CAPSULE | Refills: 0 | Status: SHIPPED | OUTPATIENT
Start: 2025-01-03 | End: 2025-01-08

## 2025-01-03 RX ADMIN — AMLODIPINE BESYLATE 10 MG: 10 TABLET ORAL at 08:17

## 2025-01-03 RX ADMIN — TIZANIDINE 4 MG: 4 TABLET ORAL at 14:50

## 2025-01-03 RX ADMIN — TIZANIDINE 4 MG: 4 TABLET ORAL at 08:18

## 2025-01-03 RX ADMIN — OMEGA-3-ACID ETHYL ESTERS CAPSULES 1 G: 1 CAPSULE, LIQUID FILLED ORAL at 08:18

## 2025-01-03 RX ADMIN — ASPIRIN 81 MG CHEWABLE TABLET 81 MG: 81 TABLET CHEWABLE at 08:17

## 2025-01-03 RX ADMIN — DULOXETINE HYDROCHLORIDE 60 MG: 60 CAPSULE, DELAYED RELEASE ORAL at 08:17

## 2025-01-03 RX ADMIN — INSULIN LISPRO 1 UNITS: 100 INJECTION, SOLUTION INTRAVENOUS; SUBCUTANEOUS at 12:24

## 2025-01-03 RX ADMIN — WATER 1000 MG: 1 INJECTION INTRAMUSCULAR; INTRAVENOUS; SUBCUTANEOUS at 08:19

## 2025-01-03 RX ADMIN — PREGABALIN 150 MG: 75 CAPSULE ORAL at 08:16

## 2025-01-03 RX ADMIN — ROSUVASTATIN 40 MG: 20 TABLET, FILM COATED ORAL at 08:16

## 2025-01-03 RX ADMIN — ACETAMINOPHEN 650 MG: 325 TABLET ORAL at 08:35

## 2025-01-03 RX ADMIN — SODIUM CHLORIDE, PRESERVATIVE FREE 10 ML: 5 INJECTION INTRAVENOUS at 08:21

## 2025-01-03 RX ADMIN — TAMSULOSIN HYDROCHLORIDE 0.4 MG: 0.4 CAPSULE ORAL at 08:18

## 2025-01-03 RX ADMIN — HYDROCHLOROTHIAZIDE 25 MG: 25 TABLET ORAL at 08:17

## 2025-01-03 RX ADMIN — PANTOPRAZOLE SODIUM 40 MG: 40 TABLET, DELAYED RELEASE ORAL at 05:59

## 2025-01-03 NOTE — CARE COORDINATION
Transition of care update. Discharge order noted. Met with patient at the bedside. His discharge plan is home with his girlfriend Jaye paredes. No needs voiced at this time.Up independent in his room. 94% on room air noted on flow sheet.   Electronically signed by Donaldo Monique RN on 1/3/2025 at 12:53 PM

## 2025-01-03 NOTE — PROGRESS NOTES
CLINICAL PHARMACY NOTE: MEDS TO BEDS    Total # of Prescriptions Filled: 1   The following medications were delivered to the patient:  Cefdinir 300 mg    Additional Documentation:   Delivered meds to patient at bedside

## 2025-01-03 NOTE — PLAN OF CARE
Problem: Pain  Goal: Verbalizes/displays adequate comfort level or baseline comfort level  Outcome: Completed     Problem: Safety - Adult  Goal: Free from fall injury  Outcome: Completed     Problem: Discharge Planning  Goal: Discharge to home or other facility with appropriate resources  Outcome: Completed

## 2025-01-03 NOTE — DISCHARGE SUMMARY
[facility]   IN-PATIENT SERVICE    Discharge Summary     Patient ID: Brian Patel  :  1950   MRN: [id number]     ACCOUNT:  [id number]   Patient's PCP: No primary care provider on file.  Admit Date: 2025   Discharge Date: 1/3/2025     Length of Stay: 2  Code Status:  Full Code  Admitting Physician: John Hernandez MD  Discharge Physician: Libra Taylor MD     Active Discharge Diagnoses:     Hospital Problem Lists:  Principal Problem:    Kidney stone  Active Problems:    Primary hypertension    Mixed hyperlipidemia    Gastroesophageal reflux disease without esophagitis    Class 3 severe obesity with serious comorbidity and body mass index (BMI) of 40.0 to 44.9 in adult    Acute cystitis with hematuria  Resolved Problems:    * No resolved hospital problems. *      Admission Condition:  fair     Discharged Condition: fair    Hospital Stay:     Hospital Course:  Brian Patel is a 74 y.o. male who was admitted for the management of   Kidney stone , presented to ER with No chief complaint on file.    Pt presented to [place] ED for evaluation of flank pain.  Workup in the ED found a 7 mm kidney stone and U/A positive for UTI.  Patient is transferred to [facility] for urology consult.   Patient arrived hemodynamic stable.  Pt was seen on room air, reports right lower back/flank pain started today.  He had kidney stone a few years ago required procedure.   He is talking normal with his though processing appears to be slow.  He reports he just feels tired and sleepy.  Per RN, girlfriend reports he has been like this recently.  No acute fever, chills, pain, N/V/abd pain.       Significant therapeutic interventions: S/p CYSTOSCOPY BILATERAL RETROGRADE PYELOGRAM RIGHT STENT INSERTION  2025     Significant Diagnostic Studies:   Labs / Micro:  CBC:   Lab Results   Component Value Date/Time    WBC 12.0 2025 05:18 AM    RBC 4.01 2025 05:18 AM    HGB 12.3 2025  05:18 AM    HCT 35.8 01/03/2025 05:18 AM    MCV 89.3 01/03/2025 05:18 AM    MCH 30.7 01/03/2025 05:18 AM    MCHC 34.4 01/03/2025 05:18 AM    RDW 13.9 01/03/2025 05:18 AM     01/03/2025 05:18 AM     BMP:    Lab Results   Component Value Date/Time    GLUCOSE 157 01/03/2025 05:18 AM     01/03/2025 05:18 AM    K 4.0 01/03/2025 05:18 AM     01/03/2025 05:18 AM    CO2 27 01/03/2025 05:18 AM    ANIONGAP 11 01/03/2025 05:18 AM    BUN 15 01/03/2025 05:18 AM    CREATININE 0.9 01/03/2025 05:18 AM    CALCIUM 9.7 01/03/2025 05:18 AM    LABGLOM >90 01/03/2025 05:18 AM        Radiology:  FL RETROGRADE PYELOGRAM W WO KUB    Result Date: 1/2/2025  Intraprocedural fluoroscopic spot images as above.  See separate procedure report for more information.       Consultations:    Consults:     Final Specialist Recommendations/Findings:   IP CONSULT TO UROLOGY      The patient was seen and examined on day of discharge and this discharge summary is in conjunction with any daily progress note from day of discharge.    Discharge plan:     Disposition: Home    Physician Follow Up:   Pt sees pcp and urology in [place]    He states he's due to have a sleep study due to daytime sleepiness in [place]   Aron Montoya MD  [address] [facility] [address] [phone]    Follow up in 1 week(s)         Requiring Further Evaluation/Follow Up POST HOSPITALIZATION/Incidental Findings:     Diet: low fat, low cholesterol diet    Activity: As tolerated    Instructions to Patient:     Discharge Medications:      Medication List        START taking these medications      cefdinir 300 MG capsule  Commonly known as: OMNICEF  Take 1 capsule by mouth 2 times daily for 5 days            CONTINUE taking these medications      AMLODIPINE BENZOATE PO     aspirin 81 MG chewable tablet     brexpiprazole 2 MG Tabs tablet  Commonly known as: REXULTI     diphenhydrAMINE 25 MG capsule  Commonly known as: BENADRYL     DULoxetine 30 MG

## 2025-01-03 NOTE — PLAN OF CARE
Problem: Pain  Goal: Verbalizes/displays adequate comfort level or baseline comfort level  1/2/2025 2301 by Charlee Tavera RN  Outcome: Progressing  1/2/2025 1905 by Hilaria Knapp RN  Outcome: Progressing     Problem: Safety - Adult  Goal: Free from fall injury  1/2/2025 2301 by Charlee Tavera RN  Outcome: Progressing  1/2/2025 1905 by Hilaria Knapp RN  Outcome: Progressing     Problem: Discharge Planning  Goal: Discharge to home or other facility with appropriate resources  1/2/2025 2301 by Charlee Tavera RN  Outcome: Progressing  1/2/2025 1905 by Hilaria Knapp RN  Outcome: Progressing

## 2025-01-03 NOTE — PROGRESS NOTES
1/3/2025 2:51 PM  Brian Patel  [id number]    Subjective:    He is sitting up in the chair   He denies any pain   He denies any difficulty voiding     Review of Systems  Constitutional: No fever or chills   Respiratory: negative for cough and hemoptysis  Cardiovascular: negative for chest pain and dyspnea  Gastrointestinal: negative for abdominal pain, diarrhea, nausea and vomiting   : See above  Derm: negative for rash and skin lesion(s)  Neurological: negative for seizures and tremors  Musculoskeletal: Negative    Psychiatric: Negative   All other reviews are negative      Scheduled Meds:   aspirin  81 mg Oral Daily    hydroCHLOROthiazide  25 mg Oral Daily    omega-3 acid ethyl esters  1 g Oral BID    pantoprazole  40 mg Oral QAM AC    tamsulosin  0.4 mg Oral Daily    tiZANidine  4 mg Oral TID    sodium chloride flush  5-40 mL IntraVENous 2 times per day    amLODIPine  10 mg Oral Daily    DULoxetine  60 mg Oral BID    pregabalin  150 mg Oral BID    primidone  150 mg Oral Nightly    cefTRIAXone (ROCEPHIN) IV  1,000 mg IntraVENous Daily    insulin lispro  0-4 Units SubCUTAneous 4x Daily AC & HS    linaclotide  145 mcg Oral QAM AC    rosuvastatin  40 mg Oral Daily    tadalafil  5 mg Oral Daily       Objective:  Vitals:    01/03/25 0724   BP: 132/77   Pulse: 93   Resp: 18   Temp: 97.7 °F (36.5 °C)   SpO2: 94%         Allergies: Ace inhibitors    General Appearance: alert and oriented to person, place and time and in no acute distress  Skin: no rash or erythema  Head: normocephalic and atraumatic  Pulmonary/Chest: normal air movement, no respiratory distress  Abdomen: soft, non-tender, non-distended  Genitourinary: no grewal   Extremities: no cyanosis, clubbing or edema         Labs:     Recent Labs     01/03/25  0518      K 4.0      CO2 27   BUN 15   CREATININE 0.9   GLUCOSE 157*   CALCIUM 9.7       Lab Results   Component Value Date/Time    HGB 12.3 01/03/2025 05:18 AM    HCT 35.8 01/03/2025 05:18 AM

## 2025-01-03 NOTE — DISCHARGE INSTRUCTIONS
A ureteral stent was inserted during your recent procedure.  Unlike a heart \"stent\" which is metal, short, and permanent, this ureteral stent plastic, and temporary.  It spans from your kidney, down the ureter, and into your bladder.  This will need to be removed, so it is very important that you follow-up with your doctor.    IMPORTANT - This ureteral stent will likely cause you to experience frequent urination, urgency to urinate, back/flank pain with urination, and/or blood in the urine.  These things are very normal.  Taking the pain medications and/or anti-inflammatories will help to manage this discomfort if present.  If you have any questions or concerns you can contact [facility]rology office at [phone].     Ureteral Stent Placement: What to Expect at Home  Your Recovery     A ureteral (say \"you-REE-ter-ul\") stent is a thin, hollow tube that is placed in the ureter to help urine pass from the kidney into the bladder. Ureters are the tubes that connect the kidneys to the bladder.  You may have a small amount of blood in your urine for 1 to 3 days after the procedure.  While the stent is in place, you may have to urinate more often, feel a sudden need to urinate, or feel like you can't completely empty your bladder. You may feel some pain when you urinate or do strenuous activity. You also may notice a small amount of blood in your urine after strenuous activities. These side effects usually don't prevent people from doing their normal daily activities.  You may have a thin string coming out of your urethra. Your urethra is the tube that carries urine from your bladder to outside your body. This string is attached to the stent. Try not to pull on the string. It will be used to pull out the stent when you no longer need it.  After the procedure, urine may flow better from your kidneys to your bladder. A ureteral stent may be left in place for several days or for as long as several months. Your doctor will  take it out when you no longer need it. Or, in some cases, it may be taken out at home.  This care sheet gives you a general idea about how long it will take for you to recover. But each person recovers at a different pace. Follow the steps below to get better as quickly as possible.  How can you care for yourself at home?  Activity    Rest when you feel tired. Getting enough sleep will help you recover.     Avoid strenuous activities, such as bicycle riding, jogging, weight lifting, or aerobic exercise, until your doctor says it is okay.     Ask your doctor when you can drive again.     Most people are able to return to work the day after the procedure. If your work requires intense activity, you may feel pain in your kidney area or get tired easily. If this happens, you may need to do less strenuous activities while the stent is in.     Ask your doctor when it is okay for you to have sex.   Diet    You can eat your normal diet. If your stomach is upset, try bland, low-fat foods like plain rice, broiled chicken, toast, and yogurt.     Drink plenty of fluids (unless your doctor tells you not to).   Medicines    Your doctor will tell you if and when you can restart your medicines. You will also get instructions about taking any new medicines.     If you take aspirin or some other blood thinner, ask your doctor if and when to start taking it again. Make sure that you understand exactly what your doctor wants you to do.     Be safe with medicines. Take pain medicines exactly as directed.  If the doctor gave you a prescription medicine for pain, take it as prescribed.  If you are not taking a prescription pain medicine, ask your doctor if you can take an over-the-counter medicine.     If you think your pain medicine is making you sick to your stomach:  Take your medicine after meals (unless your doctor has told you not to).  Ask your doctor for a different pain medicine.     If your doctor prescribed antibiotics, take

## 2025-01-03 NOTE — PROGRESS NOTES
Pt discharged from [address]. IV removed. Provider Nargis at bedside speaking with Pt and Pt's significant other regarding urological procedure results/following up outpatient. Discharge instructions reviewed with Pt and Pt's significant other. Both verbalized understanding. Pt leaving unit via wheelchair w/ all belongings + prescription.

## 2025-01-09 LAB
EKG ATRIAL RATE: 139 BPM
EKG P AXIS: 34 DEGREES
EKG P-R INTERVAL: 146 MS
EKG Q-T INTERVAL: 278 MS
EKG QRS DURATION: 80 MS
EKG QTC CALCULATION (BAZETT): 423 MS
EKG R AXIS: 57 DEGREES
EKG T AXIS: 21 DEGREES
EKG VENTRICULAR RATE: 139 BPM

## 2025-01-09 NOTE — PROGRESS NOTES
Physician Progress Note      PATIENT:               ACACIA WESTBROOK  CSN #:                  [id number]  :                       1950  ADMIT DATE:       2025 9:09 PM  DISCH DATE:        1/3/2025 4:36 PM  RESPONDING  PROVIDER #:        Libra Taylor MD          QUERY TEXT:    Patient admitted with kidney and right ureteral stone and UTI. Documentation   reflects sepsis in Operative report.  If possible, please document in the   progress notes and discharge summary if sepsis was:    The medical record reflects the following:  Risk Factors: UTI  Clinical Indicators: Beginning @ admission,  142 118. Temp oral 100   100.9 then axillary 102.7. WBC 14.0 12.0. Lactic acid 2.4.  Treatment: Rocephin 1g IV daily, discharged on Omnicef.  Options provided:  -- Sepsis confirmed after study  -- Sepsis treated and resolved  -- Sepsis ruled out after study  -- Other - I will add my own diagnosis  -- Disagree - Not applicable / Not valid  -- Disagree - Clinically unable to determine / Unknown  -- Refer to Clinical Documentation Reviewer    PROVIDER RESPONSE TEXT:    Sepsis confirmed after study.    Query created by: Quynh Panda on 2025 10:01 AM      Electronically signed by:  Libra Taylor MD 2025 9:14 AM

## 2025-01-17 ENCOUNTER — APPOINTMENT (OUTPATIENT)
Dept: GENERAL RADIOLOGY | Age: 75
DRG: 872 | End: 2025-01-17
Payer: MEDICARE

## 2025-01-17 ENCOUNTER — HOSPITAL ENCOUNTER (INPATIENT)
Age: 75
LOS: 5 days | Discharge: HOME | DRG: 872 | End: 2025-01-23
Attending: EMERGENCY MEDICINE | Admitting: FAMILY MEDICINE
Payer: MEDICARE

## 2025-01-17 ENCOUNTER — APPOINTMENT (OUTPATIENT)
Dept: CT IMAGING | Age: 75
DRG: 872 | End: 2025-01-17
Payer: MEDICARE

## 2025-01-17 DIAGNOSIS — N30.00 ACUTE CYSTITIS WITHOUT HEMATURIA: Primary | ICD-10-CM

## 2025-01-17 DIAGNOSIS — N20.0 BILATERAL KIDNEY STONES: ICD-10-CM

## 2025-01-17 DIAGNOSIS — N20.0 KIDNEY STONE: ICD-10-CM

## 2025-01-17 DIAGNOSIS — Z96.0 URETERAL STENT PRESENT: ICD-10-CM

## 2025-01-17 DIAGNOSIS — A41.9 SEPTICEMIA (HCC): ICD-10-CM

## 2025-01-17 LAB
ALBUMIN SERPL-MCNC: 4.1 G/DL (ref 3.5–5.2)
ALP SERPL-CCNC: 174 U/L (ref 40–129)
ALT SERPL-CCNC: 29 U/L (ref 0–40)
ANION GAP SERPL CALCULATED.3IONS-SCNC: 10 MMOL/L (ref 7–16)
AST SERPL-CCNC: 18 U/L (ref 0–39)
BACTERIA URNS QL MICRO: ABNORMAL
BASOPHILS # BLD: 0.06 K/UL (ref 0–0.2)
BASOPHILS NFR BLD: 0 % (ref 0–2)
BILIRUB SERPL-MCNC: 1.3 MG/DL (ref 0–1.2)
BILIRUB UR QL STRIP: NEGATIVE
BUN SERPL-MCNC: 11 MG/DL (ref 6–23)
CALCIUM SERPL-MCNC: 9.9 MG/DL (ref 8.6–10.2)
CHLORIDE SERPL-SCNC: 94 MMOL/L (ref 98–107)
CLARITY UR: ABNORMAL
CO2 SERPL-SCNC: 29 MMOL/L (ref 22–29)
COLOR UR: YELLOW
CREAT SERPL-MCNC: 1 MG/DL (ref 0.7–1.2)
EOSINOPHIL # BLD: 0.08 K/UL (ref 0.05–0.5)
EOSINOPHILS RELATIVE PERCENT: 1 % (ref 0–6)
EPI CELLS #/AREA URNS HPF: ABNORMAL /HPF
ERYTHROCYTE [DISTWIDTH] IN BLOOD BY AUTOMATED COUNT: 14.2 % (ref 11.5–15)
FLUAV RNA RESP QL NAA+PROBE: NOT DETECTED
FLUBV RNA RESP QL NAA+PROBE: NOT DETECTED
GFR, ESTIMATED: 79 ML/MIN/1.73M2
GLUCOSE SERPL-MCNC: 159 MG/DL (ref 74–99)
GLUCOSE UR STRIP-MCNC: NEGATIVE MG/DL
HCT VFR BLD AUTO: 42 % (ref 37–54)
HGB BLD-MCNC: 14.9 G/DL (ref 12.5–16.5)
HGB UR QL STRIP.AUTO: ABNORMAL
IMM GRANULOCYTES # BLD AUTO: 0.07 K/UL (ref 0–0.58)
IMM GRANULOCYTES NFR BLD: 1 % (ref 0–5)
KETONES UR STRIP-MCNC: NEGATIVE MG/DL
LACTATE BLDV-SCNC: 2.8 MMOL/L (ref 0.5–1.9)
LEUKOCYTE ESTERASE UR QL STRIP: ABNORMAL
LYMPHOCYTES NFR BLD: 1.97 K/UL (ref 1.5–4)
LYMPHOCYTES RELATIVE PERCENT: 14 % (ref 20–42)
MAGNESIUM SERPL-MCNC: 1.5 MG/DL (ref 1.6–2.6)
MCH RBC QN AUTO: 31.8 PG (ref 26–35)
MCHC RBC AUTO-ENTMCNC: 35.5 G/DL (ref 32–34.5)
MCV RBC AUTO: 89.6 FL (ref 80–99.9)
MONOCYTES NFR BLD: 1.45 K/UL (ref 0.1–0.95)
MONOCYTES NFR BLD: 10 % (ref 2–12)
MUCOUS THREADS URNS QL MICRO: PRESENT
NEUTROPHILS NFR BLD: 74 % (ref 43–80)
NEUTS SEG NFR BLD: 10.27 K/UL (ref 1.8–7.3)
NITRITE UR QL STRIP: POSITIVE
PH UR STRIP: 6 [PH] (ref 5–9)
PLATELET # BLD AUTO: 190 K/UL (ref 130–450)
PMV BLD AUTO: 8.1 FL (ref 7–12)
POTASSIUM SERPL-SCNC: 4.1 MMOL/L (ref 3.5–5)
PROT SERPL-MCNC: 7.8 G/DL (ref 6.4–8.3)
PROT UR STRIP-MCNC: 100 MG/DL
RBC # BLD AUTO: 4.69 M/UL (ref 3.8–5.8)
RBC #/AREA URNS HPF: ABNORMAL /HPF
SARS-COV-2 RNA RESP QL NAA+PROBE: NOT DETECTED
SODIUM SERPL-SCNC: 133 MMOL/L (ref 132–146)
SOURCE: NORMAL
SP GR UR STRIP: 1.01 (ref 1–1.03)
SPECIMEN DESCRIPTION: NORMAL
TROPONIN I SERPL HS-MCNC: 22 NG/L (ref 0–11)
UROBILINOGEN UR STRIP-ACNC: 1 EU/DL (ref 0–1)
WBC #/AREA URNS HPF: ABNORMAL /HPF
WBC OTHER # BLD: 13.9 K/UL (ref 4.5–11.5)

## 2025-01-17 PROCEDURE — 6360000002 HC RX W HCPCS

## 2025-01-17 PROCEDURE — 87040 BLOOD CULTURE FOR BACTERIA: CPT

## 2025-01-17 PROCEDURE — 71046 X-RAY EXAM CHEST 2 VIEWS: CPT

## 2025-01-17 PROCEDURE — 83605 ASSAY OF LACTIC ACID: CPT

## 2025-01-17 PROCEDURE — 87086 URINE CULTURE/COLONY COUNT: CPT

## 2025-01-17 PROCEDURE — 81001 URINALYSIS AUTO W/SCOPE: CPT

## 2025-01-17 PROCEDURE — 84484 ASSAY OF TROPONIN QUANT: CPT

## 2025-01-17 PROCEDURE — 87636 SARSCOV2 & INF A&B AMP PRB: CPT

## 2025-01-17 PROCEDURE — 99285 EMERGENCY DEPT VISIT HI MDM: CPT

## 2025-01-17 PROCEDURE — 6370000000 HC RX 637 (ALT 250 FOR IP)

## 2025-01-17 PROCEDURE — 83735 ASSAY OF MAGNESIUM: CPT

## 2025-01-17 PROCEDURE — 2580000003 HC RX 258

## 2025-01-17 PROCEDURE — 80053 COMPREHEN METABOLIC PANEL: CPT

## 2025-01-17 PROCEDURE — 6360000004 HC RX CONTRAST MEDICATION: Performed by: RADIOLOGY

## 2025-01-17 PROCEDURE — 74177 CT ABD & PELVIS W/CONTRAST: CPT

## 2025-01-17 PROCEDURE — 2500000003 HC RX 250 WO HCPCS

## 2025-01-17 PROCEDURE — 85025 COMPLETE CBC W/AUTO DIFF WBC: CPT

## 2025-01-17 PROCEDURE — 96374 THER/PROPH/DIAG INJ IV PUSH: CPT

## 2025-01-17 PROCEDURE — 87077 CULTURE AEROBIC IDENTIFY: CPT

## 2025-01-17 PROCEDURE — 93005 ELECTROCARDIOGRAM TRACING: CPT

## 2025-01-17 RX ORDER — 0.9 % SODIUM CHLORIDE 0.9 %
1000 INTRAVENOUS SOLUTION INTRAVENOUS ONCE
Status: COMPLETED | OUTPATIENT
Start: 2025-01-17 | End: 2025-01-17

## 2025-01-17 RX ORDER — ACETAMINOPHEN 500 MG
1000 TABLET ORAL ONCE
Status: COMPLETED | OUTPATIENT
Start: 2025-01-17 | End: 2025-01-17

## 2025-01-17 RX ORDER — IOPAMIDOL 755 MG/ML
75 INJECTION, SOLUTION INTRAVASCULAR
Status: COMPLETED | OUTPATIENT
Start: 2025-01-17 | End: 2025-01-17

## 2025-01-17 RX ADMIN — IOPAMIDOL 75 ML: 755 INJECTION, SOLUTION INTRAVENOUS at 22:51

## 2025-01-17 RX ADMIN — WATER 2000 MG: 1 INJECTION INTRAMUSCULAR; INTRAVENOUS; SUBCUTANEOUS at 23:18

## 2025-01-17 RX ADMIN — ACETAMINOPHEN 1000 MG: 500 TABLET ORAL at 21:18

## 2025-01-17 RX ADMIN — SODIUM CHLORIDE 1000 ML: 9 INJECTION, SOLUTION INTRAVENOUS at 22:40

## 2025-01-17 ASSESSMENT — LIFESTYLE VARIABLES
HOW MANY STANDARD DRINKS CONTAINING ALCOHOL DO YOU HAVE ON A TYPICAL DAY: PATIENT DOES NOT DRINK
HOW OFTEN DO YOU HAVE A DRINK CONTAINING ALCOHOL: NEVER

## 2025-01-18 PROBLEM — N30.00 ACUTE CYSTITIS WITHOUT HEMATURIA: Status: ACTIVE | Noted: 2025-01-01

## 2025-01-18 PROBLEM — A41.9 SEPSIS DUE TO URINARY TRACT INFECTION (HCC): Status: ACTIVE | Noted: 2025-01-18

## 2025-01-18 PROBLEM — N39.0 SEPSIS DUE TO URINARY TRACT INFECTION (HCC): Status: ACTIVE | Noted: 2025-01-18

## 2025-01-18 LAB
ANION GAP SERPL CALCULATED.3IONS-SCNC: 9 MMOL/L (ref 7–16)
BUN SERPL-MCNC: 10 MG/DL (ref 6–23)
CALCIUM SERPL-MCNC: 9.4 MG/DL (ref 8.6–10.2)
CHLORIDE SERPL-SCNC: 98 MMOL/L (ref 98–107)
CO2 SERPL-SCNC: 27 MMOL/L (ref 22–29)
CREAT SERPL-MCNC: 1 MG/DL (ref 0.7–1.2)
GFR, ESTIMATED: 78 ML/MIN/1.73M2
GLUCOSE SERPL-MCNC: 170 MG/DL (ref 74–99)
POTASSIUM SERPL-SCNC: 4 MMOL/L (ref 3.5–5)
SODIUM SERPL-SCNC: 134 MMOL/L (ref 132–146)
TROPONIN I SERPL HS-MCNC: 22 NG/L (ref 0–11)

## 2025-01-18 PROCEDURE — 2060000000 HC ICU INTERMEDIATE R&B

## 2025-01-18 PROCEDURE — 6370000000 HC RX 637 (ALT 250 FOR IP): Performed by: FAMILY MEDICINE

## 2025-01-18 PROCEDURE — 2580000003 HC RX 258

## 2025-01-18 PROCEDURE — 6360000002 HC RX W HCPCS: Performed by: INTERNAL MEDICINE

## 2025-01-18 PROCEDURE — 6360000002 HC RX W HCPCS: Performed by: FAMILY MEDICINE

## 2025-01-18 PROCEDURE — 6370000000 HC RX 637 (ALT 250 FOR IP): Performed by: INTERNAL MEDICINE

## 2025-01-18 PROCEDURE — 99222 1ST HOSP IP/OBS MODERATE 55: CPT | Performed by: FAMILY MEDICINE

## 2025-01-18 PROCEDURE — 2580000003 HC RX 258: Performed by: FAMILY MEDICINE

## 2025-01-18 PROCEDURE — 2500000003 HC RX 250 WO HCPCS: Performed by: FAMILY MEDICINE

## 2025-01-18 PROCEDURE — 80048 BASIC METABOLIC PNL TOTAL CA: CPT

## 2025-01-18 PROCEDURE — NBSRV NON-BILLABLE SERVICE: Performed by: INTERNAL MEDICINE

## 2025-01-18 RX ORDER — TRAZODONE HYDROCHLORIDE 50 MG/1
100 TABLET, FILM COATED ORAL NIGHTLY
Status: DISCONTINUED | OUTPATIENT
Start: 2025-01-18 | End: 2025-01-21

## 2025-01-18 RX ORDER — ENOXAPARIN SODIUM 100 MG/ML
30 INJECTION SUBCUTANEOUS 2 TIMES DAILY
Status: DISCONTINUED | OUTPATIENT
Start: 2025-01-19 | End: 2025-01-23 | Stop reason: HOSPADM

## 2025-01-18 RX ORDER — LOSARTAN POTASSIUM 50 MG/1
100 TABLET ORAL DAILY
Status: DISCONTINUED | OUTPATIENT
Start: 2025-01-18 | End: 2025-01-23 | Stop reason: HOSPADM

## 2025-01-18 RX ORDER — ACETAMINOPHEN 325 MG/1
325 TABLET ORAL ONCE
Status: COMPLETED | OUTPATIENT
Start: 2025-01-18 | End: 2025-01-18

## 2025-01-18 RX ORDER — ACETAMINOPHEN 500 MG
1000 TABLET ORAL EVERY 8 HOURS PRN
Status: DISCONTINUED | OUTPATIENT
Start: 2025-01-18 | End: 2025-01-23 | Stop reason: HOSPADM

## 2025-01-18 RX ORDER — PREGABALIN 100 MG/1
100 CAPSULE ORAL DAILY
Status: DISCONTINUED | OUTPATIENT
Start: 2025-01-18 | End: 2025-01-23 | Stop reason: HOSPADM

## 2025-01-18 RX ORDER — TAMSULOSIN HYDROCHLORIDE 0.4 MG/1
0.4 CAPSULE ORAL DAILY
Status: DISCONTINUED | OUTPATIENT
Start: 2025-01-18 | End: 2025-01-23 | Stop reason: HOSPADM

## 2025-01-18 RX ORDER — DULOXETIN HYDROCHLORIDE 60 MG/1
60 CAPSULE, DELAYED RELEASE ORAL 2 TIMES DAILY
Status: DISCONTINUED | OUTPATIENT
Start: 2025-01-18 | End: 2025-01-23 | Stop reason: HOSPADM

## 2025-01-18 RX ORDER — ROSUVASTATIN CALCIUM 20 MG/1
40 TABLET, COATED ORAL DAILY
Status: DISCONTINUED | OUTPATIENT
Start: 2025-01-18 | End: 2025-01-23 | Stop reason: HOSPADM

## 2025-01-18 RX ORDER — 0.9 % SODIUM CHLORIDE 0.9 %
1000 INTRAVENOUS SOLUTION INTRAVENOUS ONCE
Status: COMPLETED | OUTPATIENT
Start: 2025-01-18 | End: 2025-01-18

## 2025-01-18 RX ORDER — ACETAMINOPHEN 325 MG/1
650 TABLET ORAL EVERY 6 HOURS PRN
Status: DISCONTINUED | OUTPATIENT
Start: 2025-01-18 | End: 2025-01-18

## 2025-01-18 RX ORDER — AMLODIPINE BESYLATE 5 MG/1
10 TABLET ORAL DAILY
Status: DISCONTINUED | OUTPATIENT
Start: 2025-01-18 | End: 2025-01-23 | Stop reason: HOSPADM

## 2025-01-18 RX ORDER — ACETAMINOPHEN 650 MG/1
650 SUPPOSITORY RECTAL EVERY 6 HOURS PRN
Status: DISCONTINUED | OUTPATIENT
Start: 2025-01-18 | End: 2025-01-23 | Stop reason: HOSPADM

## 2025-01-18 RX ORDER — SODIUM CHLORIDE 0.9 % (FLUSH) 0.9 %
5-40 SYRINGE (ML) INJECTION PRN
Status: DISCONTINUED | OUTPATIENT
Start: 2025-01-18 | End: 2025-01-23 | Stop reason: HOSPADM

## 2025-01-18 RX ORDER — SODIUM CHLORIDE 9 MG/ML
INJECTION, SOLUTION INTRAVENOUS CONTINUOUS
Status: ACTIVE | OUTPATIENT
Start: 2025-01-18 | End: 2025-01-18

## 2025-01-18 RX ORDER — KETOROLAC TROMETHAMINE 15 MG/ML
15 INJECTION, SOLUTION INTRAMUSCULAR; INTRAVENOUS ONCE
Status: COMPLETED | OUTPATIENT
Start: 2025-01-18 | End: 2025-01-18

## 2025-01-18 RX ORDER — SODIUM CHLORIDE 0.9 % (FLUSH) 0.9 %
5-40 SYRINGE (ML) INJECTION EVERY 12 HOURS SCHEDULED
Status: DISCONTINUED | OUTPATIENT
Start: 2025-01-18 | End: 2025-01-23 | Stop reason: HOSPADM

## 2025-01-18 RX ORDER — PANTOPRAZOLE SODIUM 40 MG/1
40 TABLET, DELAYED RELEASE ORAL
Status: DISCONTINUED | OUTPATIENT
Start: 2025-01-18 | End: 2025-01-23 | Stop reason: HOSPADM

## 2025-01-18 RX ORDER — ENOXAPARIN SODIUM 100 MG/ML
40 INJECTION SUBCUTANEOUS DAILY
Status: DISCONTINUED | OUTPATIENT
Start: 2025-01-19 | End: 2025-01-18 | Stop reason: DRUGHIGH

## 2025-01-18 RX ORDER — PRIMIDONE 50 MG/1
50 TABLET ORAL NIGHTLY
Status: DISCONTINUED | OUTPATIENT
Start: 2025-01-18 | End: 2025-01-23 | Stop reason: HOSPADM

## 2025-01-18 RX ORDER — ACETAMINOPHEN 650 MG/1
650 SUPPOSITORY RECTAL EVERY 6 HOURS PRN
Status: DISCONTINUED | OUTPATIENT
Start: 2025-01-18 | End: 2025-01-18

## 2025-01-18 RX ADMIN — SODIUM CHLORIDE 1000 ML: 9 INJECTION, SOLUTION INTRAVENOUS at 00:51

## 2025-01-18 RX ADMIN — WATER 1000 MG: 1 INJECTION INTRAMUSCULAR; INTRAVENOUS; SUBCUTANEOUS at 23:24

## 2025-01-18 RX ADMIN — PANTOPRAZOLE SODIUM 40 MG: 40 TABLET, DELAYED RELEASE ORAL at 07:47

## 2025-01-18 RX ADMIN — BREXPIPRAZOLE 2 MG: 2 TABLET ORAL at 10:01

## 2025-01-18 RX ADMIN — TRAZODONE HYDROCHLORIDE 100 MG: 50 TABLET ORAL at 20:21

## 2025-01-18 RX ADMIN — PREGABALIN 100 MG: 100 CAPSULE ORAL at 07:48

## 2025-01-18 RX ADMIN — PRIMIDONE 50 MG: 50 TABLET ORAL at 20:21

## 2025-01-18 RX ADMIN — DULOXETINE HYDROCHLORIDE 60 MG: 60 CAPSULE, DELAYED RELEASE ORAL at 07:48

## 2025-01-18 RX ADMIN — Medication 10 ML: at 20:21

## 2025-01-18 RX ADMIN — KETOROLAC TROMETHAMINE 15 MG: 15 INJECTION, SOLUTION INTRAMUSCULAR; INTRAVENOUS at 11:35

## 2025-01-18 RX ADMIN — ACETAMINOPHEN 1000 MG: 500 TABLET ORAL at 17:22

## 2025-01-18 RX ADMIN — ACETAMINOPHEN 650 MG: 325 TABLET ORAL at 05:03

## 2025-01-18 RX ADMIN — SODIUM CHLORIDE: 9 INJECTION, SOLUTION INTRAVENOUS at 03:18

## 2025-01-18 RX ADMIN — TAMSULOSIN HYDROCHLORIDE 0.4 MG: 0.4 CAPSULE ORAL at 07:48

## 2025-01-18 RX ADMIN — ROSUVASTATIN CALCIUM 40 MG: 20 TABLET, FILM COATED ORAL at 07:48

## 2025-01-18 RX ADMIN — ACETAMINOPHEN 325 MG: 325 TABLET ORAL at 10:15

## 2025-01-18 RX ADMIN — DULOXETINE HYDROCHLORIDE 60 MG: 60 CAPSULE, DELAYED RELEASE ORAL at 20:21

## 2025-01-18 ASSESSMENT — LIFESTYLE VARIABLES
HOW OFTEN DO YOU HAVE A DRINK CONTAINING ALCOHOL: NEVER
HOW MANY STANDARD DRINKS CONTAINING ALCOHOL DO YOU HAVE ON A TYPICAL DAY: PATIENT DOES NOT DRINK

## 2025-01-18 ASSESSMENT — PAIN SCALES - GENERAL
PAINLEVEL_OUTOF10: 6
PAINLEVEL_OUTOF10: 0
PAINLEVEL_OUTOF10: 3
PAINLEVEL_OUTOF10: 6

## 2025-01-18 ASSESSMENT — PAIN DESCRIPTION - DESCRIPTORS
DESCRIPTORS: ACHING;CRUSHING;DISCOMFORT
DESCRIPTORS: ACHING;CRUSHING;DISCOMFORT
DESCRIPTORS: ACHING;DISCOMFORT

## 2025-01-18 ASSESSMENT — PAIN DESCRIPTION - LOCATION
LOCATION: HEAD

## 2025-01-18 ASSESSMENT — PAIN DESCRIPTION - ORIENTATION
ORIENTATION: MID

## 2025-01-18 ASSESSMENT — PAIN - FUNCTIONAL ASSESSMENT: PAIN_FUNCTIONAL_ASSESSMENT: NONE - DENIES PAIN

## 2025-01-18 NOTE — PLAN OF CARE
Problem: Discharge Planning  Goal: Discharge to home or other facility with appropriate resources  Outcome: Progressing  Flowsheets (Taken 1/18/2025 0533 by Petrona Vasquez, RN)  Discharge to home or other facility with appropriate resources:   Identify barriers to discharge with patient and caregiver   Identify discharge learning needs (meds, wound care, etc)     Problem: Safety - Adult  Goal: Free from fall injury  Outcome: Progressing

## 2025-01-18 NOTE — CONSULTS
1/18/2025 8:41 AM  Brian Patel  [id number]     Chief Complaint:    Previously placed ureteral stent and fever    History of Present Illness:      The patient is a 74 y.o. male patient who presented to the hospital with complaints of fevers at home.  He previously underwent a right ureteral stent placement on January 1 with Dr. Costa for a distal obstructing calculus and sepsis of urinary origin.  He now again presents septic with recurrent fevers.  This morning he states he is feeling slightly better.  He is eating breakfast and is currently comfortable.  He is urinating without issue and has minimal stent discomfort.      Past Medical History:   Diagnosis Date    GERD (gastroesophageal reflux disease)     Hyperlipidemia     Hypertension     Kidney stone          Past Surgical History:   Procedure Laterality Date    COLONOSCOPY      DEBRIDEMENT Right 01/02/2025    CYSTOSCOPY BILATERAL RETROGRADE PYELOGRAM RIGHT STENT INSERTION [place] [facility] [place]    ENDOSCOPY, COLON, DIAGNOSTIC      HERNIA REPAIR      TONSILLECTOMY      TOTAL KNEE ARTHROPLASTY Right     WISDOM TOOTH EXTRACTION         Medications Prior to Admission:    Medications Prior to Admission: aspirin 81 MG chewable tablet, Take 1 tablet by mouth daily  DULoxetine (CYMBALTA) 30 MG extended release capsule, Take 2 capsules by mouth 2 times daily  pregabalin (LYRICA) 100 MG capsule, Take 1 capsule by mouth daily. Max Daily Amount: 100 mg  primidone (MYSOLINE) 50 MG tablet, Take 1 tablet by mouth nightly  traZODone (DESYREL) 100 MG tablet, Take 1 tablet by mouth nightly  metFORMIN (GLUCOPHAGE) 500 MG tablet, Take 1 tablet by mouth 2 times daily (with meals)  Rosuvastatin Calcium 40 MG CPSP, Take 40 mg by mouth daily  tadalafil (CIALIS) 5 MG tablet, Take 1 tablet by mouth Daily  linaclotide (LINZESS) 145 MCG capsule, Take 1 capsule by mouth every morning (before breakfast)  brexpiprazole (REXULTI) 2 MG TABS tablet, Take 1

## 2025-01-18 NOTE — PROGRESS NOTES
Spiritual Health History and Assessment/Progress Note  [facility]    Initial Encounter, Spiritual/Emotional Needs,  ,  ,      Name: Brian Patel MRN: [id number]    Age: 74 y.o.     Sex: male   Language: English   Adventist: None   Sepsis due to urinary tract infection (HCC)     Date: 1/18/2025                           Spiritual Assessment began in [facility] PIC/ICU        Referral/Consult From: Rounding   Encounter Overview/Reason: Initial Encounter, Spiritual/Emotional Needs  Service Provided For: Patient    Kacey, Belief, Meaning:   Patient identifies as spiritual  Family/Friends No family/friends present      Importance and Influence:  Patient has spiritual/personal beliefs that influence decisions regarding their health  Family/Friends No family/friends present    Community:  Patient feels well-supported. Support system includes: Spouse/Partner  Family/Friends No family/friends present    Assessment and Plan of Care:     Patient Interventions include: Engaged in theological reflection  Family/Friends Interventions include: No family/friends present    Patient Plan of Care: Spiritual Care available upon further referral  Family/Friends Plan of Care: No family/friends present    Electronically signed by Chaplain Kayla on 1/18/2025 at 11:25 AM

## 2025-01-18 NOTE — PROGRESS NOTES
This patient is on medication that requires renal, weight, and/or indication dose adjustment.      Date Body Weight IBW  Adjusted BW SCr  CrCl Dialysis status   1/18/2025   Ideal body weight: 79.9 kg (176 lb 2.4 oz)  Adjusted ideal body weight: 100.6 kg (221 lb 11 oz) Serum creatinine: 1 mg/dL 01/17/25 2125  Estimated creatinine clearance: 94 mL/min N/a       Pharmacy has dose-adjusted the following medication(s):    Date Previous Order Adjusted Order   1/18/2025 Enoxaparin 40mg daily Enoxaparin 30mg twice daily       These changes were made per protocol according to the [facility]   Automatic Renal Dose Adjustment Policy.     *Please note this dose may need readjusted if patient's condition changes.    Please contact pharmacy with any questions regarding these changes.    Ben Lovett RPH  1/18/2025  2:59 AM

## 2025-01-18 NOTE — H&P
Additional Contrast?->None Decision Support Exception - unselect if not a suspected or confirmed emergency medical condition->Emergency Medical Condition (MA) FINDINGS: Lower Chest: Nodule with calcification at the right lower lobe appears to represent a granuloma. Organs: Bilateral nephrolithiasis. Stent present from right renal pelvis through bladder. No hydronephrosis. Renal low-attenuation lesions probably are cysts. Some lesions too small to characterize however. No follow-up imaging is recommended. There is vague hypodensity left hepatic lobe ventrally around image 41, probably fatty infiltration. GI/Bowel: No obstruction.  There is mild diffuse prominence with air-fluid levels that may represent low-grade ileus.  Diverticulosis coli. Pelvis:   Left larger than right inguinal hernia.  Enlarged prostate. Peritoneum/Retroperitoneum: There are moderate atherosclerotic calcifications present.   Note there is a small calculus along the margin of the right ureteral stent located at the mid to upper pelvis, inlet region of coronal image 71 with length measurement of 5-6 mm and axial image 144 up to 4 mm. Mild retroperitoneal fluid. Bones/Soft Tissues: Lumbar postop and degenerative changes.     1. Bilateral nephrolithiasis without hydronephrosis. 2. Right ureteral stent.  A 4 mm axial measurement calculus is along the ventral margin of the stent at the pelvic inlet. RECOMMENDATIONS: Comparison to prior exams.     XR CHEST (2 VW)    Result Date: 1/17/2025  EXAMINATION: TWO XRAY VIEWS OF THE CHEST 1/17/2025 10:31 pm COMPARISON: 11/10/2022 HISTORY: ORDERING SYSTEM PROVIDED HISTORY: flu symptoms TECHNOLOGIST PROVIDED HISTORY: Reason for exam:->flu symptoms FINDINGS: The lungs are without acute focal process.  There is no effusion or pneumothorax. The cardiomediastinal silhouette is without acute process. The osseous structures are without acute process.     No acute process.       EKG: sinus tachycardia    ASSESSMENT:

## 2025-01-18 NOTE — DISCHARGE INSTRUCTIONS
Call upon discharge to schedule a follow-up visit with [place]/Janis/Jan/[place] ([facility]) at [phone]

## 2025-01-18 NOTE — PROGRESS NOTES
This patient was admitted by my colleague on this calender day, a few hours before my shift began.    History of present illness from History and Physical:  74 y.o. male with a history of HTN, HLD presents with fever.  Reports he was in his usual state of health when yesterday he developed fever and felt \"in a fog.\"  Had associated chills. Workup in ED significant for glucose 159, lactic acid 2.8, wbc 13.9.  UA nitrite and leukocyte esterase positive.  CT abd/pelvis bilateral nephrolithiasis, right ureteral stent with 4mm calculus.  Patient was admitted 1/1-1/3 with nephrolithiasis, had ureteral stent placed.  Plan for lithotripsy on 1/20.  Given tylenol, rocephin, 2L bolus in ED.      Sepsis due to UTI on empiric Rocephin follow cultures  Nephrolithiasis urology consult.  Monitor daily BMP  Hypertension: Reasonably controlled on home medications.  Adjust as needed.   Continue to monitor.  Hyperlipidemia statin  DVT prophylaxis: SCDs add Lovenox when okay by  Full code.  Disposition: Home likely in 2 days

## 2025-01-18 NOTE — ED PROVIDER NOTES
Heart rate millimeter tachycardic, lungs are clear and equal.  Abdomen soft and nontender.  No CVA tenderness.  No jaundice or icterus.  Arms and legs are well-perfused and neurovasc intact with no pretibial edema or calf pain.  NIH or scale 0 with no focal neurologic deficits.       [NC]   2136 EKG interpreted by me shows sinus tachycardia rate 116 with PVC.  No acute ischemic ST-T wave changes, otherwise agree with resident. [NC]   Sat Jan 18, 2025   0001 CRITICAL CARE:   35 MINUTES.          Please note that the withdrawal or failure to initiate urgent interventions for this patient would likely result in a life threatening deterioration or permanent disability.  Accordingly this patient received the above mentioned time, excluding separately billable procedures.       [NC]      ED Course User Index  [NC] Leonardo Martinez, DO       Medications administered today:  Medications   sodium chloride 0.9 % bolus 1,000 mL (1,000 mLs IntraVENous New Bag 1/18/25 0051)   sodium chloride flush 0.9 % injection 5-40 mL (has no administration in time range)   sodium chloride flush 0.9 % injection 5-40 mL (has no administration in time range)   enoxaparin (LOVENOX) injection 40 mg (has no administration in time range)   acetaminophen (TYLENOL) tablet 650 mg (has no administration in time range)     Or   acetaminophen (TYLENOL) suppository 650 mg (has no administration in time range)   0.9 % sodium chloride infusion (has no administration in time range)   cefTRIAXone (ROCEPHIN) 1,000 mg in sterile water 10 mL IV syringe (has no administration in time range)   acetaminophen (TYLENOL) tablet 1,000 mg (1,000 mg Oral Given 1/17/25 2118)   sodium chloride 0.9 % bolus 1,000 mL (0 mLs IntraVENous Stopped 1/17/25 2355)   cefTRIAXone (ROCEPHIN) 2,000 mg in sterile water 20 mL IV syringe (2,000 mg IntraVENous Given 1/17/25 2318)   iopamidol (ISOVUE-370) 76 % injection 75 mL (75 mLs IntraVENous Given 1/17/25 2251)       CONSULTS:

## 2025-01-18 NOTE — PROGRESS NOTES
4 Eyes Skin Assessment     NAME:  Brian Patel  YOB: 1950  MEDICAL RECORD NUMBER:  [id number]    The patient is being assessed for  Admission    I agree that at least one RN has performed a thorough Head to Toe Skin Assessment on the patient. ALL assessment sites listed below have been assessed.      Areas assessed by both nurses:    Head, Face, Ears, Shoulders, Back, Chest, Arms, Elbows, Hands, Sacrum. Buttock, Coccyx, Ischium, Legs. Feet and Heels, and Under Medical Devices         Does the Patient have a Wound? No noted wound(s)       Cody Prevention initiated by RN: Yes  Wound Care Orders initiated by RN: No    Pressure Injury (Stage 3,4, Unstageable, DTI, NWPT, and Complex wounds) if present, place Wound referral order by RN under : No    New Ostomies, if present place, Ostomy referral order under : No     Nurse 1 eSignature: Electronically signed by Petrona Vasquez RN on 1/18/25 at 5:25 AM EST    **SHARE this note so that the co-signing nurse can place an eSignature**    Nurse 2 eSignature: Electronically signed by Tricia Delgadillo RN on 1/18/25 at 8:05 AM EST

## 2025-01-19 LAB
ANION GAP SERPL CALCULATED.3IONS-SCNC: 13 MMOL/L (ref 7–16)
BASOPHILS # BLD: 0 K/UL (ref 0–0.2)
BASOPHILS NFR BLD: 0 % (ref 0–2)
BUN SERPL-MCNC: 10 MG/DL (ref 6–23)
CALCIUM SERPL-MCNC: 9.4 MG/DL (ref 8.6–10.2)
CHLORIDE SERPL-SCNC: 100 MMOL/L (ref 98–107)
CO2 SERPL-SCNC: 21 MMOL/L (ref 22–29)
CREAT SERPL-MCNC: 1 MG/DL (ref 0.7–1.2)
EKG ATRIAL RATE: 116 BPM
EKG P AXIS: 42 DEGREES
EKG P-R INTERVAL: 162 MS
EKG Q-T INTERVAL: 312 MS
EKG QRS DURATION: 78 MS
EKG QTC CALCULATION (BAZETT): 433 MS
EKG R AXIS: 54 DEGREES
EKG T AXIS: 52 DEGREES
EKG VENTRICULAR RATE: 116 BPM
EOSINOPHIL # BLD: 0 K/UL (ref 0.05–0.5)
EOSINOPHILS RELATIVE PERCENT: 0 % (ref 0–6)
ERYTHROCYTE [DISTWIDTH] IN BLOOD BY AUTOMATED COUNT: 14.2 % (ref 11.5–15)
GFR, ESTIMATED: 79 ML/MIN/1.73M2
GLUCOSE SERPL-MCNC: 160 MG/DL (ref 74–99)
HCT VFR BLD AUTO: 38.8 % (ref 37–54)
HGB BLD-MCNC: 13.3 G/DL (ref 12.5–16.5)
LYMPHOCYTES NFR BLD: 1.75 K/UL (ref 1.5–4)
LYMPHOCYTES RELATIVE PERCENT: 17 % (ref 20–42)
MCH RBC QN AUTO: 31.1 PG (ref 26–35)
MCHC RBC AUTO-ENTMCNC: 34.3 G/DL (ref 32–34.5)
MCV RBC AUTO: 90.7 FL (ref 80–99.9)
MONOCYTES NFR BLD: 1.11 K/UL (ref 0.1–0.95)
MONOCYTES NFR BLD: 10 % (ref 2–12)
NEUTROPHILS NFR BLD: 73 % (ref 43–80)
NEUTS SEG NFR BLD: 7.74 K/UL (ref 1.8–7.3)
PLATELET CONFIRMATION: NORMAL
PLATELET, FLUORESCENCE: 186 K/UL (ref 130–450)
PMV BLD AUTO: 9.1 FL (ref 7–12)
POTASSIUM SERPL-SCNC: 3.2 MMOL/L (ref 3.5–5)
RBC # BLD AUTO: 4.28 M/UL (ref 3.8–5.8)
RBC # BLD: NORMAL 10*6/UL
SODIUM SERPL-SCNC: 134 MMOL/L (ref 132–146)
WBC OTHER # BLD: 10.6 K/UL (ref 4.5–11.5)

## 2025-01-19 PROCEDURE — 6360000002 HC RX W HCPCS: Performed by: FAMILY MEDICINE

## 2025-01-19 PROCEDURE — 6370000000 HC RX 637 (ALT 250 FOR IP): Performed by: INTERNAL MEDICINE

## 2025-01-19 PROCEDURE — 36415 COLL VENOUS BLD VENIPUNCTURE: CPT

## 2025-01-19 PROCEDURE — 80048 BASIC METABOLIC PNL TOTAL CA: CPT

## 2025-01-19 PROCEDURE — 2580000003 HC RX 258: Performed by: FAMILY MEDICINE

## 2025-01-19 PROCEDURE — 2500000003 HC RX 250 WO HCPCS: Performed by: FAMILY MEDICINE

## 2025-01-19 PROCEDURE — 99232 SBSQ HOSP IP/OBS MODERATE 35: CPT | Performed by: INTERNAL MEDICINE

## 2025-01-19 PROCEDURE — 6370000000 HC RX 637 (ALT 250 FOR IP): Performed by: FAMILY MEDICINE

## 2025-01-19 PROCEDURE — 93010 ELECTROCARDIOGRAM REPORT: CPT | Performed by: INTERNAL MEDICINE

## 2025-01-19 PROCEDURE — 2060000000 HC ICU INTERMEDIATE R&B

## 2025-01-19 PROCEDURE — 85025 COMPLETE CBC W/AUTO DIFF WBC: CPT

## 2025-01-19 RX ORDER — 0.9 % SODIUM CHLORIDE 0.9 %
1000 INTRAVENOUS SOLUTION INTRAVENOUS ONCE
Status: COMPLETED | OUTPATIENT
Start: 2025-01-19 | End: 2025-01-19

## 2025-01-19 RX ORDER — POTASSIUM CHLORIDE 1500 MG/1
40 TABLET, EXTENDED RELEASE ORAL ONCE
Status: COMPLETED | OUTPATIENT
Start: 2025-01-19 | End: 2025-01-19

## 2025-01-19 RX ADMIN — SODIUM CHLORIDE 1000 ML: 9 INJECTION, SOLUTION INTRAVENOUS at 01:06

## 2025-01-19 RX ADMIN — TAMSULOSIN HYDROCHLORIDE 0.4 MG: 0.4 CAPSULE ORAL at 08:33

## 2025-01-19 RX ADMIN — ACETAMINOPHEN 1000 MG: 500 TABLET ORAL at 06:33

## 2025-01-19 RX ADMIN — POTASSIUM CHLORIDE 40 MEQ: 1500 TABLET, EXTENDED RELEASE ORAL at 16:46

## 2025-01-19 RX ADMIN — TRAZODONE HYDROCHLORIDE 100 MG: 50 TABLET ORAL at 19:58

## 2025-01-19 RX ADMIN — LOSARTAN POTASSIUM 100 MG: 50 TABLET, FILM COATED ORAL at 08:34

## 2025-01-19 RX ADMIN — DULOXETINE HYDROCHLORIDE 60 MG: 60 CAPSULE, DELAYED RELEASE ORAL at 08:34

## 2025-01-19 RX ADMIN — ROSUVASTATIN CALCIUM 40 MG: 20 TABLET, FILM COATED ORAL at 08:37

## 2025-01-19 RX ADMIN — ACETAMINOPHEN 1000 MG: 500 TABLET ORAL at 18:19

## 2025-01-19 RX ADMIN — AMLODIPINE BESYLATE 10 MG: 5 TABLET ORAL at 08:33

## 2025-01-19 RX ADMIN — WATER 1000 MG: 1 INJECTION INTRAMUSCULAR; INTRAVENOUS; SUBCUTANEOUS at 22:53

## 2025-01-19 RX ADMIN — PANTOPRAZOLE SODIUM 40 MG: 40 TABLET, DELAYED RELEASE ORAL at 06:33

## 2025-01-19 RX ADMIN — Medication 10 ML: at 19:59

## 2025-01-19 RX ADMIN — PRIMIDONE 50 MG: 50 TABLET ORAL at 19:58

## 2025-01-19 RX ADMIN — DULOXETINE HYDROCHLORIDE 60 MG: 60 CAPSULE, DELAYED RELEASE ORAL at 19:58

## 2025-01-19 RX ADMIN — PREGABALIN 100 MG: 100 CAPSULE ORAL at 08:33

## 2025-01-19 RX ADMIN — ENOXAPARIN SODIUM 30 MG: 100 INJECTION SUBCUTANEOUS at 08:34

## 2025-01-19 RX ADMIN — Medication 10 ML: at 08:37

## 2025-01-19 RX ADMIN — BREXPIPRAZOLE 2 MG: 2 TABLET ORAL at 08:34

## 2025-01-19 ASSESSMENT — PAIN DESCRIPTION - ORIENTATION: ORIENTATION: MID

## 2025-01-19 ASSESSMENT — PAIN DESCRIPTION - LOCATION: LOCATION: HEAD

## 2025-01-19 ASSESSMENT — PAIN SCALES - GENERAL: PAINLEVEL_OUTOF10: 3

## 2025-01-19 ASSESSMENT — PAIN DESCRIPTION - DESCRIPTORS: DESCRIPTORS: ACHING;CRUSHING;DISCOMFORT

## 2025-01-19 NOTE — PLAN OF CARE
Problem: Discharge Planning  Goal: Discharge to home or other facility with appropriate resources  1/19/2025 1635 by Willam Gonzalez RN  Outcome: Progressing  1/19/2025 0300 by Willig, Samantha, RN  Outcome: Progressing     Problem: Safety - Adult  Goal: Free from fall injury  1/19/2025 1635 by Willam Gonzalez, RN  Outcome: Progressing  1/19/2025 0300 by Willig, Samantha, RN  Outcome: Progressing

## 2025-01-19 NOTE — PLAN OF CARE
Problem: Discharge Planning  Goal: Discharge to home or other facility with appropriate resources  1/19/2025 0300 by Willig, Samantha, RN  Outcome: Progressing  1/18/2025 1744 by Willam Gonzalez, RN  Outcome: Progressing  Flowsheets (Taken 1/18/2025 0553 by Petrona Vasquez, RN)  Discharge to home or other facility with appropriate resources:   Identify barriers to discharge with patient and caregiver   Identify discharge learning needs (meds, wound care, etc)     Problem: Safety - Adult  Goal: Free from fall injury  1/19/2025 0300 by Willig, Samantha, RN  Outcome: Progressing  1/18/2025 1744 by Willam Gonzalez, RN  Outcome: Progressing

## 2025-01-19 NOTE — FLOWSHEET NOTE
CMU called for a HR sustained in the 150s. When I entered the room, patient was in the bathroom. States that he is experiencing minimal SOB from the exertion. Patient helped back to bed and states that he is starting to feel better.

## 2025-01-19 NOTE — PROGRESS NOTES
1/19/2025 1:28 PM  Brian Patel  [id number]    Subjective: Patient states he continues to feel better.  Patient's wife is at bedside and she is insistent that he has a stone procedure while he is here admitted to [facility].  Patient's urine culture has not yet finalized.      Scheduled Meds:   sodium chloride flush  5-40 mL IntraVENous 2 times per day    cefTRIAXone (ROCEPHIN) IV  1,000 mg IntraVENous Q24H    enoxaparin  30 mg SubCUTAneous BID    amLODIPine  10 mg Oral Daily    DULoxetine  60 mg Oral BID    losartan  100 mg Oral Daily    pantoprazole  40 mg Oral QAM AC    brexpiprazole  2 mg Oral Daily    pregabalin  100 mg Oral Daily    primidone  50 mg Oral Nightly    rosuvastatin  40 mg Oral Daily    tamsulosin  0.4 mg Oral Daily    traZODone  100 mg Oral Nightly       Objective:  Vitals:    01/19/25 1300   BP: 124/78   Pulse: (!) 110   Resp: 22   Temp: 98.4 °F (36.9 °C)   SpO2: 94%         Allergies: Ace inhibitors    General Appearance: alert and oriented to person, place and time and in no acute distress  Skin: no rash or erythema  Head: normocephalic and atraumatic  Pulmonary/Chest: normal air movement, no respiratory distress  Abdomen: soft, non-tender, non-distended  Genitourinary: No urine at bedside for examination  Extremities: no cyanosis, clubbing or edema         Labs:     Recent Labs     01/19/25  0819      K 3.2*      CO2 21*   BUN 10   CREATININE 1.0   GLUCOSE 160*   CALCIUM 9.4       Lab Results   Component Value Date/Time    HGB 13.3 01/19/2025 08:19 AM    HCT 38.8 01/19/2025 08:19 AM       No results found for: \"PSA\"      Assessment/Plan:  Right nephrolithiasis, status post right ureteral stent  Sepsis of urinary origin    Long discussion with patient and wife at bedside.  At this point in time his final urine culture has not yet even speciated.  It would be safer to make sure patient is on appropriate antibiotics and have a repeat urine culture prior to ureteroscopic

## 2025-01-19 NOTE — FLOWSHEET NOTE
External catheter applied due to shortness of breath with exertion and inability to successfully use urinal.

## 2025-01-19 NOTE — PROGRESS NOTES
Admitting Date and Time: 1/17/2025  8:03 PM  Admit Dx: Septicemia (HCC) [A41.9]  Acute cystitis without hematuria [N30.00]  Bilateral kidney stones [N20.0]  Sepsis due to urinary tract infection (HCC) [A41.9, N39.0]  Ureteral stent present [Z96.0]    Subjective:    Pt feels sweats  Per RN: no issues    ROS: denies fever, chills, cp, sob, n/v, HA unless stated above.     sodium chloride flush  5-40 mL IntraVENous 2 times per day    cefTRIAXone (ROCEPHIN) IV  1,000 mg IntraVENous Q24H    enoxaparin  30 mg SubCUTAneous BID    amLODIPine  10 mg Oral Daily    DULoxetine  60 mg Oral BID    losartan  100 mg Oral Daily    pantoprazole  40 mg Oral QAM AC    brexpiprazole  2 mg Oral Daily    pregabalin  100 mg Oral Daily    primidone  50 mg Oral Nightly    rosuvastatin  40 mg Oral Daily    tamsulosin  0.4 mg Oral Daily    traZODone  100 mg Oral Nightly     sodium chloride flush, 5-40 mL, PRN  acetaminophen, 1,000 mg, Q8H PRN   Or  acetaminophen, 650 mg, Q6H PRN         Objective:    /71   Pulse (!) 133   Temp 97.9 °F (36.6 °C)   Resp 22   Ht 1.854 m (6' 1\")   Wt 129.1 kg (284 lb 9.6 oz)   SpO2 94%   BMI 37.55 kg/m²   General Appearance: alert and oriented to person, place and time and in no acute distress  Skin: warm and dry  Head: normocephalic and atraumatic  Eyes: pupils equal, round, and reactive to light, extraocular eye movements intact, conjunctivae normal  Neck: neck supple and non tender without mass   Pulmonary/Chest: clear to auscultation bilaterally- no wheezes, rales or rhonchi, normal air movement, no respiratory distress  Cardiovascular: normal rate, normal S1 and S2 and no carotid bruits  Abdomen: soft, non-tender, non-distended, normal bowel sounds, no masses or organomegaly  Extremities: no cyanosis, no clubbing and traceedema  Neurologic: no cranial nerve deficit and speech normal      Recent Labs     01/17/25  2125 01/18/25  0308 01/19/25  0819    134 134   K 4.1 4.0 3.2*   CL 94* 98 100  Repair Performed By Another Provider Text (Leave Blank If You Do Not Want): After the tissue was excised the defect was repaired by another provider.

## 2025-01-20 LAB
ANION GAP SERPL CALCULATED.3IONS-SCNC: 15 MMOL/L (ref 7–16)
BUN SERPL-MCNC: 10 MG/DL (ref 6–23)
CALCIUM SERPL-MCNC: 9.5 MG/DL (ref 8.6–10.2)
CHLORIDE SERPL-SCNC: 101 MMOL/L (ref 98–107)
CO2 SERPL-SCNC: 19 MMOL/L (ref 22–29)
CREAT SERPL-MCNC: 1 MG/DL (ref 0.7–1.2)
GFR, ESTIMATED: 79 ML/MIN/1.73M2
GLUCOSE BLD-MCNC: 181 MG/DL (ref 74–99)
GLUCOSE BLD-MCNC: 222 MG/DL (ref 74–99)
GLUCOSE BLD-MCNC: 250 MG/DL (ref 74–99)
GLUCOSE SERPL-MCNC: 231 MG/DL (ref 74–99)
MICROORGANISM SPEC CULT: ABNORMAL
POTASSIUM SERPL-SCNC: 4.1 MMOL/L (ref 3.5–5)
SERVICE CMNT-IMP: ABNORMAL
SODIUM SERPL-SCNC: 135 MMOL/L (ref 132–146)
SPECIMEN DESCRIPTION: ABNORMAL

## 2025-01-20 PROCEDURE — 80048 BASIC METABOLIC PNL TOTAL CA: CPT

## 2025-01-20 PROCEDURE — 2060000000 HC ICU INTERMEDIATE R&B

## 2025-01-20 PROCEDURE — 6360000002 HC RX W HCPCS: Performed by: FAMILY MEDICINE

## 2025-01-20 PROCEDURE — 6370000000 HC RX 637 (ALT 250 FOR IP): Performed by: INTERNAL MEDICINE

## 2025-01-20 PROCEDURE — 82962 GLUCOSE BLOOD TEST: CPT

## 2025-01-20 PROCEDURE — 2500000003 HC RX 250 WO HCPCS: Performed by: FAMILY MEDICINE

## 2025-01-20 PROCEDURE — 6370000000 HC RX 637 (ALT 250 FOR IP): Performed by: FAMILY MEDICINE

## 2025-01-20 PROCEDURE — 99232 SBSQ HOSP IP/OBS MODERATE 35: CPT | Performed by: FAMILY MEDICINE

## 2025-01-20 PROCEDURE — 36415 COLL VENOUS BLD VENIPUNCTURE: CPT

## 2025-01-20 RX ORDER — DEXTROSE MONOHYDRATE 100 MG/ML
INJECTION, SOLUTION INTRAVENOUS CONTINUOUS PRN
Status: DISCONTINUED | OUTPATIENT
Start: 2025-01-20 | End: 2025-01-23 | Stop reason: HOSPADM

## 2025-01-20 RX ORDER — OXYCODONE AND ACETAMINOPHEN 10; 325 MG/1; MG/1
1 TABLET ORAL EVERY 6 HOURS PRN
Status: DISCONTINUED | OUTPATIENT
Start: 2025-01-20 | End: 2025-01-23 | Stop reason: HOSPADM

## 2025-01-20 RX ORDER — METOPROLOL TARTRATE 1 MG/ML
5 INJECTION, SOLUTION INTRAVENOUS ONCE
Status: COMPLETED | OUTPATIENT
Start: 2025-01-20 | End: 2025-01-20

## 2025-01-20 RX ORDER — INSULIN GLARGINE 100 [IU]/ML
15 INJECTION, SOLUTION SUBCUTANEOUS 2 TIMES DAILY
Status: DISCONTINUED | OUTPATIENT
Start: 2025-01-20 | End: 2025-01-23 | Stop reason: HOSPADM

## 2025-01-20 RX ORDER — GLUCAGON 1 MG/ML
1 KIT INJECTION PRN
Status: DISCONTINUED | OUTPATIENT
Start: 2025-01-20 | End: 2025-01-23 | Stop reason: HOSPADM

## 2025-01-20 RX ORDER — INSULIN LISPRO 100 [IU]/ML
0-4 INJECTION, SOLUTION INTRAVENOUS; SUBCUTANEOUS EVERY 6 HOURS SCHEDULED
Status: DISCONTINUED | OUTPATIENT
Start: 2025-01-20 | End: 2025-01-20

## 2025-01-20 RX ORDER — INSULIN LISPRO 100 [IU]/ML
0-4 INJECTION, SOLUTION INTRAVENOUS; SUBCUTANEOUS
Status: DISCONTINUED | OUTPATIENT
Start: 2025-01-21 | End: 2025-01-23 | Stop reason: HOSPADM

## 2025-01-20 RX ORDER — CIPROFLOXACIN 500 MG/1
500 TABLET, FILM COATED ORAL 2 TIMES DAILY
Qty: 14 TABLET | Refills: 0 | Status: SHIPPED | OUTPATIENT
Start: 2025-01-20 | End: 2025-01-23 | Stop reason: HOSPADM

## 2025-01-20 RX ADMIN — ROSUVASTATIN CALCIUM 40 MG: 20 TABLET, FILM COATED ORAL at 09:26

## 2025-01-20 RX ADMIN — TAMSULOSIN HYDROCHLORIDE 0.4 MG: 0.4 CAPSULE ORAL at 09:27

## 2025-01-20 RX ADMIN — METOPROLOL TARTRATE 5 MG: 5 INJECTION INTRAVENOUS at 09:29

## 2025-01-20 RX ADMIN — PROPRANOLOL HYDROCHLORIDE 60 MG: 40 TABLET ORAL at 20:49

## 2025-01-20 RX ADMIN — PANTOPRAZOLE SODIUM 40 MG: 40 TABLET, DELAYED RELEASE ORAL at 05:39

## 2025-01-20 RX ADMIN — ACETAMINOPHEN 1000 MG: 500 TABLET ORAL at 05:39

## 2025-01-20 RX ADMIN — DULOXETINE HYDROCHLORIDE 60 MG: 60 CAPSULE, DELAYED RELEASE ORAL at 09:27

## 2025-01-20 RX ADMIN — TIZANIDINE 4 MG: 4 TABLET ORAL at 12:29

## 2025-01-20 RX ADMIN — INSULIN LISPRO 1 UNITS: 100 INJECTION, SOLUTION INTRAVENOUS; SUBCUTANEOUS at 17:21

## 2025-01-20 RX ADMIN — TIZANIDINE 4 MG: 4 TABLET ORAL at 20:49

## 2025-01-20 RX ADMIN — TRAZODONE HYDROCHLORIDE 100 MG: 50 TABLET ORAL at 22:59

## 2025-01-20 RX ADMIN — PRIMIDONE 50 MG: 50 TABLET ORAL at 20:50

## 2025-01-20 RX ADMIN — PROPRANOLOL HYDROCHLORIDE 60 MG: 40 TABLET ORAL at 12:59

## 2025-01-20 RX ADMIN — Medication 10 ML: at 20:51

## 2025-01-20 RX ADMIN — DULOXETINE HYDROCHLORIDE 60 MG: 60 CAPSULE, DELAYED RELEASE ORAL at 20:49

## 2025-01-20 RX ADMIN — INSULIN GLARGINE 15 UNITS: 100 INJECTION, SOLUTION SUBCUTANEOUS at 12:29

## 2025-01-20 RX ADMIN — Medication 10 ML: at 09:28

## 2025-01-20 RX ADMIN — WATER 1000 MG: 1 INJECTION INTRAMUSCULAR; INTRAVENOUS; SUBCUTANEOUS at 22:58

## 2025-01-20 RX ADMIN — BREXPIPRAZOLE 2 MG: 2 TABLET ORAL at 09:26

## 2025-01-20 RX ADMIN — INSULIN GLARGINE 15 UNITS: 100 INJECTION, SOLUTION SUBCUTANEOUS at 20:51

## 2025-01-20 RX ADMIN — INSULIN LISPRO 2 UNITS: 100 INJECTION, SOLUTION INTRAVENOUS; SUBCUTANEOUS at 12:29

## 2025-01-20 RX ADMIN — PREGABALIN 100 MG: 100 CAPSULE ORAL at 09:27

## 2025-01-20 ASSESSMENT — PAIN DESCRIPTION - DESCRIPTORS: DESCRIPTORS: ACHING

## 2025-01-20 ASSESSMENT — PAIN DESCRIPTION - LOCATION: LOCATION: HEAD

## 2025-01-20 ASSESSMENT — PAIN SCALES - GENERAL
PAINLEVEL_OUTOF10: 6
PAINLEVEL_OUTOF10: 2

## 2025-01-20 NOTE — PROGRESS NOTES
1/20/2025 12:59 PM  Brian Patel  [id number]    Subjective:  awake and alert, no distress   No family present     Review of Systems  Constitutional: No fever or chills   Respiratory: negative for cough and hemoptysis  Cardiovascular: negative for chest pain and dyspnea  Gastrointestinal: negative for abdominal pain, diarrhea, nausea and vomiting   : See above  Derm: negative for rash and skin lesion(s)  Neurological: negative for seizures and tremors  Musculoskeletal: Negative    Psychiatric: Negative   All other reviews are negative      Scheduled Meds:   insulin glargine  15 Units SubCUTAneous BID    insulin lispro  0-4 Units SubCUTAneous 4 times per day    [START ON 1/21/2025] linaclotide  145 mcg Oral QAM AC    propranolol  60 mg Oral BID    tiZANidine  4 mg Oral BID    sodium chloride flush  5-40 mL IntraVENous 2 times per day    cefTRIAXone (ROCEPHIN) IV  1,000 mg IntraVENous Q24H    enoxaparin  30 mg SubCUTAneous BID    amLODIPine  10 mg Oral Daily    DULoxetine  60 mg Oral BID    losartan  100 mg Oral Daily    pantoprazole  40 mg Oral QAM AC    brexpiprazole  2 mg Oral Daily    pregabalin  100 mg Oral Daily    primidone  50 mg Oral Nightly    rosuvastatin  40 mg Oral Daily    tamsulosin  0.4 mg Oral Daily    traZODone  100 mg Oral Nightly       Objective:  Vitals:    01/20/25 1024   BP: 116/74   Pulse: (!) 104   Resp: 20   Temp: 97.9 °F (36.6 °C)   SpO2: 96%         Allergies: Ace inhibitors    General Appearance: alert and oriented to person, place and time and in no acute distress  Skin: no rash or erythema  Head: normocephalic and atraumatic  Pulmonary/Chest: normal air movement, no respiratory distress  Abdomen: soft, non-tender, non-distended  Genitourinary: no grewal   Extremities: no cyanosis, clubbing or edema         Labs:     Recent Labs     01/20/25  0914      K 4.1      CO2 19*   BUN 10   CREATININE 1.0   GLUCOSE 231*   CALCIUM 9.5       Lab Results   Component Value Date/Time

## 2025-01-20 NOTE — PROGRESS NOTES
[facility] Hospitalist Progress Note    Admitting Date and Time: 1/17/2025  8:03 PM  Admit Dx: Septicemia (HCC) [A41.9]  Acute cystitis without hematuria [N30.00]  Bilateral kidney stones [N20.0]  Sepsis due to urinary tract infection (HCC) [A41.9, N39.0]  Ureteral stent present [Z96.0]      Assessment:    Principal Problem:    Sepsis due to urinary tract infection (HCC)  Active Problems:    Kidney stone    Primary hypertension    Mixed hyperlipidemia    Gastroesophageal reflux disease without esophagitis    Class 3 severe obesity with serious comorbidity and body mass index (BMI) of 40.0 to 44.9 in adult    Acute cystitis without hematuria  Resolved Problems:    * No resolved hospital problems. *      Plan:  1/18/2025  Sepsis  UTI  - IVF  - Rocephin  - f/u culture results  - urology c/s from ED     HTN  HLD  - continue home BP meds with hold parameters  - continue home statin       1/18/2025  Sepsis due to UTI on empiric Rocephin follow cultures  Nephrolithiasis urology consult.  Monitor daily BMP  Hypertension: Reasonably controlled on home medications.  Adjust as needed.   Continue to monitor.  Hyperlipidemia statin  DVT prophylaxis: SCDs add Lovenox when okay by  Full code.  Disposition: Home likely in 2 days    1/19/2025  History of present illness from History and Physical:  74 y.o. male with a history of HTN, HLD presents with fever.  Reports he was in his usual state of health when yesterday he developed fever and felt \"in a fog.\"  Had associated chills. Workup in ED significant for glucose 159, lactic acid 2.8, wbc 13.9.  UA nitrite and leukocyte esterase positive.  CT abd/pelvis bilateral nephrolithiasis, right ureteral stent with 4mm calculus.  Patient was admitted 1/1-1/3 with nephrolithiasis, had ureteral stent placed.  Plan for lithotripsy on 1/20.  Given tylenol, rocephin, 2L bolus in ED.       Sepsis due to UTI on empiric Rocephin follow cultures which started to grow e coli.  Sn

## 2025-01-20 NOTE — PLAN OF CARE
Problem: Discharge Planning  Goal: Discharge to home or other facility with appropriate resources  1/20/2025 0402 by Tricia Delgadillo, RN  Outcome: Progressing     Problem: Safety - Adult  Goal: Free from fall injury  1/20/2025 0402 by Tricia Delgadillo, RN  Outcome: Progressing

## 2025-01-20 NOTE — ACP (ADVANCE CARE PLANNING)
Advance Care Planning   Healthcare Decision Maker:    Primary Decision Maker: dana ziegler - Girlfriend - [phone]

## 2025-01-21 ENCOUNTER — PREP FOR PROCEDURE (OUTPATIENT)
Dept: UROLOGY | Age: 75
End: 2025-01-21

## 2025-01-21 LAB
ANION GAP SERPL CALCULATED.3IONS-SCNC: 10 MMOL/L (ref 7–16)
BASOPHILS # BLD: 0.08 K/UL (ref 0–0.2)
BASOPHILS NFR BLD: 1 % (ref 0–2)
BUN SERPL-MCNC: 11 MG/DL (ref 6–23)
CALCIUM SERPL-MCNC: 9.1 MG/DL (ref 8.6–10.2)
CHLORIDE SERPL-SCNC: 101 MMOL/L (ref 98–107)
CO2 SERPL-SCNC: 23 MMOL/L (ref 22–29)
CREAT SERPL-MCNC: 1 MG/DL (ref 0.7–1.2)
EOSINOPHIL # BLD: 0.35 K/UL (ref 0.05–0.5)
EOSINOPHILS RELATIVE PERCENT: 6 % (ref 0–6)
ERYTHROCYTE [DISTWIDTH] IN BLOOD BY AUTOMATED COUNT: 14.2 % (ref 11.5–15)
GFR, ESTIMATED: 79 ML/MIN/1.73M2
GLUCOSE BLD-MCNC: 154 MG/DL (ref 74–99)
GLUCOSE BLD-MCNC: 155 MG/DL (ref 74–99)
GLUCOSE BLD-MCNC: 161 MG/DL (ref 74–99)
GLUCOSE BLD-MCNC: 175 MG/DL (ref 74–99)
GLUCOSE SERPL-MCNC: 167 MG/DL (ref 74–99)
HBA1C MFR BLD: 6.9 % (ref 4–5.6)
HCT VFR BLD AUTO: 37.5 % (ref 37–54)
HGB BLD-MCNC: 12.8 G/DL (ref 12.5–16.5)
IMM GRANULOCYTES # BLD AUTO: <0.03 K/UL (ref 0–0.58)
IMM GRANULOCYTES NFR BLD: 0 % (ref 0–5)
LYMPHOCYTES NFR BLD: 1.71 K/UL (ref 1.5–4)
LYMPHOCYTES RELATIVE PERCENT: 27 % (ref 20–42)
MCH RBC QN AUTO: 31.1 PG (ref 26–35)
MCHC RBC AUTO-ENTMCNC: 34.1 G/DL (ref 32–34.5)
MCV RBC AUTO: 91.2 FL (ref 80–99.9)
MONOCYTES NFR BLD: 0.7 K/UL (ref 0.1–0.95)
MONOCYTES NFR BLD: 11 % (ref 2–12)
NEUTROPHILS NFR BLD: 54 % (ref 43–80)
NEUTS SEG NFR BLD: 3.41 K/UL (ref 1.8–7.3)
PLATELET # BLD AUTO: 204 K/UL (ref 130–450)
PMV BLD AUTO: 8.6 FL (ref 7–12)
POTASSIUM SERPL-SCNC: 3.6 MMOL/L (ref 3.5–5)
RBC # BLD AUTO: 4.11 M/UL (ref 3.8–5.8)
SODIUM SERPL-SCNC: 134 MMOL/L (ref 132–146)
WBC OTHER # BLD: 6.3 K/UL (ref 4.5–11.5)

## 2025-01-21 PROCEDURE — 99232 SBSQ HOSP IP/OBS MODERATE 35: CPT | Performed by: FAMILY MEDICINE

## 2025-01-21 PROCEDURE — 2500000003 HC RX 250 WO HCPCS: Performed by: FAMILY MEDICINE

## 2025-01-21 PROCEDURE — 36415 COLL VENOUS BLD VENIPUNCTURE: CPT

## 2025-01-21 PROCEDURE — 82962 GLUCOSE BLOOD TEST: CPT

## 2025-01-21 PROCEDURE — 85025 COMPLETE CBC W/AUTO DIFF WBC: CPT

## 2025-01-21 PROCEDURE — 6370000000 HC RX 637 (ALT 250 FOR IP): Performed by: FAMILY MEDICINE

## 2025-01-21 PROCEDURE — 83036 HEMOGLOBIN GLYCOSYLATED A1C: CPT

## 2025-01-21 PROCEDURE — 6360000002 HC RX W HCPCS: Performed by: FAMILY MEDICINE

## 2025-01-21 PROCEDURE — 80048 BASIC METABOLIC PNL TOTAL CA: CPT

## 2025-01-21 PROCEDURE — 2060000000 HC ICU INTERMEDIATE R&B

## 2025-01-21 RX ORDER — SODIUM CHLORIDE 0.9 % (FLUSH) 0.9 %
5-40 SYRINGE (ML) INJECTION PRN
Status: CANCELLED | OUTPATIENT
Start: 2025-01-21

## 2025-01-21 RX ORDER — TRAZODONE HYDROCHLORIDE 50 MG/1
200 TABLET, FILM COATED ORAL NIGHTLY
Status: DISCONTINUED | OUTPATIENT
Start: 2025-01-21 | End: 2025-01-23 | Stop reason: HOSPADM

## 2025-01-21 RX ORDER — SODIUM CHLORIDE 0.9 % (FLUSH) 0.9 %
5-40 SYRINGE (ML) INJECTION EVERY 12 HOURS SCHEDULED
Status: CANCELLED | OUTPATIENT
Start: 2025-01-21

## 2025-01-21 RX ORDER — SODIUM CHLORIDE 9 MG/ML
INJECTION, SOLUTION INTRAVENOUS PRN
Status: CANCELLED | OUTPATIENT
Start: 2025-01-21

## 2025-01-21 RX ORDER — HYDROXYZINE PAMOATE 25 MG/1
50 CAPSULE ORAL NIGHTLY
Status: DISCONTINUED | OUTPATIENT
Start: 2025-01-21 | End: 2025-01-23 | Stop reason: HOSPADM

## 2025-01-21 RX ORDER — SODIUM CHLORIDE 9 MG/ML
INJECTION, SOLUTION INTRAVENOUS CONTINUOUS
Status: CANCELLED | OUTPATIENT
Start: 2025-01-21

## 2025-01-21 RX ADMIN — TIZANIDINE 4 MG: 4 TABLET ORAL at 08:33

## 2025-01-21 RX ADMIN — HYDROXYZINE PAMOATE 50 MG: 25 CAPSULE ORAL at 22:08

## 2025-01-21 RX ADMIN — ROSUVASTATIN CALCIUM 40 MG: 20 TABLET, FILM COATED ORAL at 08:34

## 2025-01-21 RX ADMIN — WATER 1000 MG: 1 INJECTION INTRAMUSCULAR; INTRAVENOUS; SUBCUTANEOUS at 23:52

## 2025-01-21 RX ADMIN — PROPRANOLOL HYDROCHLORIDE 60 MG: 40 TABLET ORAL at 08:35

## 2025-01-21 RX ADMIN — PRIMIDONE 50 MG: 50 TABLET ORAL at 22:09

## 2025-01-21 RX ADMIN — TIZANIDINE 4 MG: 4 TABLET ORAL at 22:08

## 2025-01-21 RX ADMIN — INSULIN GLARGINE 15 UNITS: 100 INJECTION, SOLUTION SUBCUTANEOUS at 09:20

## 2025-01-21 RX ADMIN — PREGABALIN 100 MG: 100 CAPSULE ORAL at 08:34

## 2025-01-21 RX ADMIN — PANTOPRAZOLE SODIUM 40 MG: 40 TABLET, DELAYED RELEASE ORAL at 06:12

## 2025-01-21 RX ADMIN — AMLODIPINE BESYLATE 10 MG: 5 TABLET ORAL at 08:34

## 2025-01-21 RX ADMIN — BREXPIPRAZOLE 2 MG: 2 TABLET ORAL at 08:35

## 2025-01-21 RX ADMIN — Medication 10 ML: at 23:54

## 2025-01-21 RX ADMIN — PROPRANOLOL HYDROCHLORIDE 60 MG: 40 TABLET ORAL at 22:08

## 2025-01-21 RX ADMIN — TRAZODONE HYDROCHLORIDE 200 MG: 50 TABLET ORAL at 23:52

## 2025-01-21 RX ADMIN — INSULIN GLARGINE 15 UNITS: 100 INJECTION, SOLUTION SUBCUTANEOUS at 22:10

## 2025-01-21 RX ADMIN — DULOXETINE HYDROCHLORIDE 60 MG: 60 CAPSULE, DELAYED RELEASE ORAL at 08:33

## 2025-01-21 RX ADMIN — TAMSULOSIN HYDROCHLORIDE 0.4 MG: 0.4 CAPSULE ORAL at 08:34

## 2025-01-21 RX ADMIN — LOSARTAN POTASSIUM 100 MG: 50 TABLET, FILM COATED ORAL at 08:33

## 2025-01-21 RX ADMIN — Medication 5 ML: at 14:15

## 2025-01-21 RX ADMIN — DULOXETINE HYDROCHLORIDE 60 MG: 60 CAPSULE, DELAYED RELEASE ORAL at 22:09

## 2025-01-21 NOTE — PROGRESS NOTES
[facility] Hospitalist Progress Note    Admitting Date and Time: 1/17/2025  8:03 PM  Admit Dx: Septicemia (HCC) [A41.9]  Acute cystitis without hematuria [N30.00]  Bilateral kidney stones [N20.0]  Sepsis due to urinary tract infection (HCC) [A41.9, N39.0]  Ureteral stent present [Z96.0]      Assessment:    Principal Problem:    Sepsis due to urinary tract infection (HCC)  Active Problems:    Kidney stone    Primary hypertension    Mixed hyperlipidemia    Gastroesophageal reflux disease without esophagitis    Class 3 severe obesity with serious comorbidity and body mass index (BMI) of 40.0 to 44.9 in adult    Acute cystitis without hematuria  Resolved Problems:    * No resolved hospital problems. *      Plan:  1/18/2025  Sepsis  UTI  - IVF  - Rocephin  - f/u culture results  - urology c/s from ED     HTN  HLD  - continue home BP meds with hold parameters  - continue home statin       1/18/2025  Sepsis due to UTI on empiric Rocephin follow cultures  Nephrolithiasis urology consult.  Monitor daily BMP  Hypertension: Reasonably controlled on home medications.  Adjust as needed.   Continue to monitor.  Hyperlipidemia statin  DVT prophylaxis: SCDs add Lovenox when okay by  Full code.  Disposition: Home likely in 2 days    1/19/2025  History of present illness from History and Physical:  74 y.o. male with a history of HTN, HLD presents with fever.  Reports he was in his usual state of health when yesterday he developed fever and felt \"in a fog.\"  Had associated chills. Workup in ED significant for glucose 159, lactic acid 2.8, wbc 13.9.  UA nitrite and leukocyte esterase positive.  CT abd/pelvis bilateral nephrolithiasis, right ureteral stent with 4mm calculus.  Patient was admitted 1/1-1/3 with nephrolithiasis, had ureteral stent placed.  Plan for lithotripsy on 1/20.  Given tylenol, rocephin, 2L bolus in ED.       Sepsis due to UTI on empiric Rocephin follow cultures which started to grow e coli.  Sn

## 2025-01-21 NOTE — PROGRESS NOTES
1/21/2025 8:44 AM  Brian Patel  [id number]    Subjective:    Sleeping  Arouses to verbal stimuli   Aware of plans for surgery tomorrow     Review of Systems  Constitutional: No fever or chills   Respiratory: negative for cough and hemoptysis  Cardiovascular: negative for chest pain and dyspnea  Gastrointestinal: negative for abdominal pain, diarrhea, nausea and vomiting   : See above  Derm: negative for rash and skin lesion(s)  Neurological: negative for seizures and tremors  Musculoskeletal: Negative    Psychiatric: Negative   All other reviews are negative      Scheduled Meds:   insulin glargine  15 Units SubCUTAneous BID    linaclotide  145 mcg Oral QAM AC    propranolol  60 mg Oral BID    tiZANidine  4 mg Oral BID    insulin lispro  0-4 Units SubCUTAneous 4x Daily AC & HS    sodium chloride flush  5-40 mL IntraVENous 2 times per day    cefTRIAXone (ROCEPHIN) IV  1,000 mg IntraVENous Q24H    enoxaparin  30 mg SubCUTAneous BID    amLODIPine  10 mg Oral Daily    DULoxetine  60 mg Oral BID    losartan  100 mg Oral Daily    pantoprazole  40 mg Oral QAM AC    brexpiprazole  2 mg Oral Daily    pregabalin  100 mg Oral Daily    primidone  50 mg Oral Nightly    rosuvastatin  40 mg Oral Daily    tamsulosin  0.4 mg Oral Daily    traZODone  100 mg Oral Nightly       Objective:  Vitals:    01/21/25 0819   BP: (!) 142/91   Pulse: 99   Resp: 18   Temp: 98.2 °F (36.8 °C)   SpO2: 93%         Allergies: Ace inhibitors    General Appearance: alert and oriented to person, place and time and in no acute distress  Skin: no rash or erythema  Head: normocephalic and atraumatic  Pulmonary/Chest: normal air movement, no respiratory distress  Abdomen: soft, non-tender, non-distended  Genitourinary: no grewal   Extremities: no cyanosis, clubbing or edema         Labs:     Recent Labs     01/21/25  0422      K 3.6      CO2 23   BUN 11   CREATININE 1.0   GLUCOSE 167*   CALCIUM 9.1       Lab Results   Component Value

## 2025-01-22 ENCOUNTER — ANESTHESIA EVENT (OUTPATIENT)
Dept: OPERATING ROOM | Age: 75
DRG: 872 | End: 2025-01-22
Payer: MEDICARE

## 2025-01-22 ENCOUNTER — ANESTHESIA (OUTPATIENT)
Dept: OPERATING ROOM | Age: 75
DRG: 872 | End: 2025-01-22
Payer: MEDICARE

## 2025-01-22 ENCOUNTER — APPOINTMENT (OUTPATIENT)
Dept: GENERAL RADIOLOGY | Age: 75
DRG: 872 | End: 2025-01-22
Payer: MEDICARE

## 2025-01-22 LAB
ANION GAP SERPL CALCULATED.3IONS-SCNC: 10 MMOL/L (ref 7–16)
BASOPHILS # BLD: 0.06 K/UL (ref 0–0.2)
BASOPHILS NFR BLD: 1 % (ref 0–2)
BUN SERPL-MCNC: 13 MG/DL (ref 6–23)
CALCIUM SERPL-MCNC: 9.6 MG/DL (ref 8.6–10.2)
CHLORIDE SERPL-SCNC: 101 MMOL/L (ref 98–107)
CO2 SERPL-SCNC: 25 MMOL/L (ref 22–29)
CREAT SERPL-MCNC: 1.1 MG/DL (ref 0.7–1.2)
EOSINOPHIL # BLD: 0.35 K/UL (ref 0.05–0.5)
EOSINOPHILS RELATIVE PERCENT: 6 % (ref 0–6)
ERYTHROCYTE [DISTWIDTH] IN BLOOD BY AUTOMATED COUNT: 13.9 % (ref 11.5–15)
GFR, ESTIMATED: 75 ML/MIN/1.73M2
GLUCOSE BLD-MCNC: 112 MG/DL (ref 74–99)
GLUCOSE BLD-MCNC: 129 MG/DL (ref 74–99)
GLUCOSE BLD-MCNC: 135 MG/DL (ref 74–99)
GLUCOSE BLD-MCNC: 261 MG/DL (ref 74–99)
GLUCOSE SERPL-MCNC: 148 MG/DL (ref 74–99)
HCT VFR BLD AUTO: 37.4 % (ref 37–54)
HGB BLD-MCNC: 13 G/DL (ref 12.5–16.5)
IMM GRANULOCYTES # BLD AUTO: 0.06 K/UL (ref 0–0.58)
IMM GRANULOCYTES NFR BLD: 1 % (ref 0–5)
LYMPHOCYTES NFR BLD: 1.7 K/UL (ref 1.5–4)
LYMPHOCYTES RELATIVE PERCENT: 27 % (ref 20–42)
MCH RBC QN AUTO: 31.1 PG (ref 26–35)
MCHC RBC AUTO-ENTMCNC: 34.8 G/DL (ref 32–34.5)
MCV RBC AUTO: 89.5 FL (ref 80–99.9)
MONOCYTES NFR BLD: 0.68 K/UL (ref 0.1–0.95)
MONOCYTES NFR BLD: 11 % (ref 2–12)
NEUTROPHILS NFR BLD: 55 % (ref 43–80)
NEUTS SEG NFR BLD: 3.45 K/UL (ref 1.8–7.3)
PLATELET # BLD AUTO: 246 K/UL (ref 130–450)
PMV BLD AUTO: 8.3 FL (ref 7–12)
POTASSIUM SERPL-SCNC: 3.8 MMOL/L (ref 3.5–5)
RBC # BLD AUTO: 4.18 M/UL (ref 3.8–5.8)
SODIUM SERPL-SCNC: 136 MMOL/L (ref 132–146)
WBC OTHER # BLD: 6.3 K/UL (ref 4.5–11.5)

## 2025-01-22 PROCEDURE — 6370000000 HC RX 637 (ALT 250 FOR IP): Performed by: FAMILY MEDICINE

## 2025-01-22 PROCEDURE — 85025 COMPLETE CBC W/AUTO DIFF WBC: CPT

## 2025-01-22 PROCEDURE — 82962 GLUCOSE BLOOD TEST: CPT

## 2025-01-22 PROCEDURE — 2500000003 HC RX 250 WO HCPCS: Performed by: FAMILY MEDICINE

## 2025-01-22 PROCEDURE — 6360000002 HC RX W HCPCS: Performed by: FAMILY MEDICINE

## 2025-01-22 PROCEDURE — 2060000000 HC ICU INTERMEDIATE R&B

## 2025-01-22 PROCEDURE — 36415 COLL VENOUS BLD VENIPUNCTURE: CPT

## 2025-01-22 PROCEDURE — 99232 SBSQ HOSP IP/OBS MODERATE 35: CPT | Performed by: FAMILY MEDICINE

## 2025-01-22 PROCEDURE — 80048 BASIC METABOLIC PNL TOTAL CA: CPT

## 2025-01-22 RX ORDER — IPRATROPIUM BROMIDE AND ALBUTEROL SULFATE 2.5; .5 MG/3ML; MG/3ML
1 SOLUTION RESPIRATORY (INHALATION)
Status: CANCELLED | OUTPATIENT
Start: 2025-01-22 | End: 2025-01-23

## 2025-01-22 RX ORDER — SODIUM CHLORIDE 9 MG/ML
INJECTION, SOLUTION INTRAVENOUS PRN
Status: CANCELLED | OUTPATIENT
Start: 2025-01-22

## 2025-01-22 RX ORDER — HALOPERIDOL 5 MG/ML
1 INJECTION INTRAMUSCULAR
Status: CANCELLED | OUTPATIENT
Start: 2025-01-22 | End: 2025-01-23

## 2025-01-22 RX ORDER — KETOROLAC TROMETHAMINE 15 MG/ML
15 INJECTION, SOLUTION INTRAMUSCULAR; INTRAVENOUS ONCE
Status: CANCELLED | OUTPATIENT
Start: 2025-01-22 | End: 2025-01-22

## 2025-01-22 RX ORDER — HYDRALAZINE HYDROCHLORIDE 20 MG/ML
10 INJECTION INTRAMUSCULAR; INTRAVENOUS
Status: CANCELLED | OUTPATIENT
Start: 2025-01-22

## 2025-01-22 RX ORDER — SODIUM CHLORIDE 0.9 % (FLUSH) 0.9 %
5-40 SYRINGE (ML) INJECTION PRN
Status: CANCELLED | OUTPATIENT
Start: 2025-01-22

## 2025-01-22 RX ORDER — PROCHLORPERAZINE EDISYLATE 5 MG/ML
5 INJECTION INTRAMUSCULAR; INTRAVENOUS
Status: CANCELLED | OUTPATIENT
Start: 2025-01-22 | End: 2025-01-23

## 2025-01-22 RX ORDER — LABETALOL HYDROCHLORIDE 5 MG/ML
10 INJECTION, SOLUTION INTRAVENOUS
Status: CANCELLED | OUTPATIENT
Start: 2025-01-22

## 2025-01-22 RX ORDER — DIPHENHYDRAMINE HYDROCHLORIDE 50 MG/ML
12.5 INJECTION INTRAMUSCULAR; INTRAVENOUS
Status: CANCELLED | OUTPATIENT
Start: 2025-01-22 | End: 2025-01-23

## 2025-01-22 RX ORDER — SODIUM CHLORIDE 0.9 % (FLUSH) 0.9 %
5-40 SYRINGE (ML) INJECTION EVERY 12 HOURS SCHEDULED
Status: CANCELLED | OUTPATIENT
Start: 2025-01-22

## 2025-01-22 RX ORDER — NALOXONE HYDROCHLORIDE 0.4 MG/ML
INJECTION, SOLUTION INTRAMUSCULAR; INTRAVENOUS; SUBCUTANEOUS PRN
Status: CANCELLED | OUTPATIENT
Start: 2025-01-22

## 2025-01-22 RX ORDER — MEPERIDINE HYDROCHLORIDE 25 MG/ML
12.5 INJECTION INTRAMUSCULAR; INTRAVENOUS; SUBCUTANEOUS EVERY 5 MIN PRN
Status: CANCELLED | OUTPATIENT
Start: 2025-01-22

## 2025-01-22 RX ADMIN — INSULIN LISPRO 2 UNITS: 100 INJECTION, SOLUTION INTRAVENOUS; SUBCUTANEOUS at 21:27

## 2025-01-22 RX ADMIN — PROPRANOLOL HYDROCHLORIDE 60 MG: 40 TABLET ORAL at 21:21

## 2025-01-22 RX ADMIN — LOSARTAN POTASSIUM 100 MG: 50 TABLET, FILM COATED ORAL at 10:00

## 2025-01-22 RX ADMIN — Medication 10 ML: at 10:03

## 2025-01-22 RX ADMIN — PANTOPRAZOLE SODIUM 40 MG: 40 TABLET, DELAYED RELEASE ORAL at 08:13

## 2025-01-22 RX ADMIN — TIZANIDINE 4 MG: 4 TABLET ORAL at 09:59

## 2025-01-22 RX ADMIN — DULOXETINE HYDROCHLORIDE 60 MG: 60 CAPSULE, DELAYED RELEASE ORAL at 21:22

## 2025-01-22 RX ADMIN — AMLODIPINE BESYLATE 10 MG: 5 TABLET ORAL at 09:59

## 2025-01-22 RX ADMIN — WATER 1000 MG: 1 INJECTION INTRAMUSCULAR; INTRAVENOUS; SUBCUTANEOUS at 23:07

## 2025-01-22 RX ADMIN — PREGABALIN 100 MG: 100 CAPSULE ORAL at 10:00

## 2025-01-22 RX ADMIN — DULOXETINE HYDROCHLORIDE 60 MG: 60 CAPSULE, DELAYED RELEASE ORAL at 09:59

## 2025-01-22 RX ADMIN — TIZANIDINE 4 MG: 4 TABLET ORAL at 21:21

## 2025-01-22 RX ADMIN — PRIMIDONE 50 MG: 50 TABLET ORAL at 21:22

## 2025-01-22 RX ADMIN — INSULIN GLARGINE 15 UNITS: 100 INJECTION, SOLUTION SUBCUTANEOUS at 10:24

## 2025-01-22 RX ADMIN — TRAZODONE HYDROCHLORIDE 200 MG: 50 TABLET ORAL at 23:07

## 2025-01-22 RX ADMIN — BREXPIPRAZOLE 2 MG: 2 TABLET ORAL at 10:00

## 2025-01-22 RX ADMIN — Medication 10 ML: at 21:23

## 2025-01-22 RX ADMIN — HYDROXYZINE PAMOATE 50 MG: 25 CAPSULE ORAL at 21:22

## 2025-01-22 RX ADMIN — TAMSULOSIN HYDROCHLORIDE 0.4 MG: 0.4 CAPSULE ORAL at 10:00

## 2025-01-22 RX ADMIN — ROSUVASTATIN CALCIUM 40 MG: 20 TABLET, FILM COATED ORAL at 10:00

## 2025-01-22 RX ADMIN — PROPRANOLOL HYDROCHLORIDE 60 MG: 40 TABLET ORAL at 09:59

## 2025-01-22 RX ADMIN — INSULIN GLARGINE 15 UNITS: 100 INJECTION, SOLUTION SUBCUTANEOUS at 21:26

## 2025-01-22 ASSESSMENT — PAIN SCALES - GENERAL: PAINLEVEL_OUTOF10: 0

## 2025-01-22 NOTE — PROGRESS NOTES
1/22/2025 9:43 AM  Brian Patel  [id number]    Subjective: No issues.  Patient resting in bed comfortably.  He is n.p.o. for add-on procedure today.      Scheduled Meds:   traZODone  200 mg Oral Nightly    hydrOXYzine pamoate  50 mg Oral Nightly    insulin glargine  15 Units SubCUTAneous BID    linaclotide  145 mcg Oral QAM AC    propranolol  60 mg Oral BID    tiZANidine  4 mg Oral BID    insulin lispro  0-4 Units SubCUTAneous 4x Daily AC & HS    sodium chloride flush  5-40 mL IntraVENous 2 times per day    cefTRIAXone (ROCEPHIN) IV  1,000 mg IntraVENous Q24H    enoxaparin  30 mg SubCUTAneous BID    amLODIPine  10 mg Oral Daily    DULoxetine  60 mg Oral BID    losartan  100 mg Oral Daily    pantoprazole  40 mg Oral QAM AC    brexpiprazole  2 mg Oral Daily    pregabalin  100 mg Oral Daily    primidone  50 mg Oral Nightly    rosuvastatin  40 mg Oral Daily    tamsulosin  0.4 mg Oral Daily       Objective:  Vitals:    01/22/25 0640   BP: 110/69   Pulse: 86   Resp:    Temp: 99.3 °F (37.4 °C)   SpO2: 93%         Allergies: Ace inhibitors    General Appearance: alert and oriented to person, place and time and in no acute distress  Skin: no rash or erythema  Head: normocephalic and atraumatic  Pulmonary/Chest: normal air movement, no respiratory distress  Abdomen: soft, non-tender, non-distended  Genitourinary: No urine at bedside for examination  Extremities: no cyanosis, clubbing or edema         Labs:     Recent Labs     01/22/25  0812      K 3.8      CO2 25   BUN 13   CREATININE 1.1   GLUCOSE 148*   CALCIUM 9.6       Lab Results   Component Value Date/Time    HGB 13.0 01/22/2025 08:12 AM    HCT 37.4 01/22/2025 08:12 AM       No results found for: \"PSA\"      Assessment/Plan:  Right nephrolithiasis, status post right ureteral stent  Sepsis of urinary origin    Discussed with patient he has an add-on procedure today and we do not have a confirmed time yet.  I discussed with the patient risks of procedure

## 2025-01-22 NOTE — PLAN OF CARE
Urology note    Unfortunately due to significant equipment issues we are unable to complete patient's ureteroscopy case today.  Long discussion with patient regarding this.  He agrees with the plan to cancel.  He is cleared for diet today.  He is also cleared for discharge from urology perspective.  We should keep him on antibiotics until he can be rescheduled for procedure.  I do have an opening next Wednesday where he can be scheduled outpatient for ureteroscopy.  He can be discharged on a 2-week course of p.o. antibiotics such as Bactrim DS twice daily based on his urine culture this admission. Appreciate primary team assistance with care.    Ben Bruce MD  [facility]

## 2025-01-22 NOTE — CARE COORDINATION
1/22/25 1350 CM note: covid/flu (-) 1/17/25, urine cx (+) 1/17/25, bl cx 1/17/25 ndtd. IV rocephin. Plan for cysto w/ R stent exchange today. Urology following. Room air. Discharge plan is to girlfriends home located at: [address] [place] [address]. Pt states he is independent, he lives in [place]. His girlfriend of 24 years lives in [place]. He visits for extended periods of time and has had procedures done in [place] in past[facility] Has PCP in [place]-Dr Painting. No PCP local, pt would like to establish with local PCP since he spends a lot of time here. Provided [facility] PCP list. Pt does not feel he will need HHC at [place]. Pts girlfriend will transport home. CM/SW will follow. Electronically signed by Jackie Santiago RN on 1/22/2025 at 4:27 PM   
Girlfriend says patient was home for 2 weeks and she called 911 when she found patient to be \"foggy\" and febrile.    
Home is one story with 1 step. Bathroom: walk in shower. DME available: shower chair with back and front wheeled walker. Donaldo Membreno RN [facility].   The Plan for Transition of Care is related to the following treatment goals: return home     The Patient and/or patient representative was provided with a choice of provider and agrees   with the discharge plan. [x] Yes [] No    Freedom of choice list was provided with basic dialogue that supports the patient's individualized plan of care/goals, treatment preferences and shares the quality data associated with the providers. [x] Yes [] No Had [facility] in past and chose them if indicated at d/c.   The Plan for Transition of Care is related to the following treatment goals of Septicemia (HCC) [A41.9]  Acute cystitis without hematuria [N30.00]  Bilateral kidney stones [N20.0]  Sepsis due to urinary tract infection (HCC) [A41.9, N39.0]  Ureteral stent present [Z96.0]    IF APPLICABLE: The Patient and/or patient representative Brian and his family were provided with a choice of provider and agrees with the discharge plan. Freedom of choice list with basic dialogue that supports the patient's individualized plan of care/goals and shares the quality data associated with the providers was provided to:     Patient Representative Name:       The Patient and/or Patient Representative Agree with the Discharge Plan?      Donaldo Membreno  Case Management Department

## 2025-01-22 NOTE — PROGRESS NOTES
[facility] Hospitalist Progress Note    Admitting Date and Time: 1/17/2025  8:03 PM  Admit Dx: Septicemia (HCC) [A41.9]  Acute cystitis without hematuria [N30.00]  Bilateral kidney stones [N20.0]  Sepsis due to urinary tract infection (HCC) [A41.9, N39.0]  Ureteral stent present [Z96.0]      Assessment:    Principal Problem:    Sepsis due to urinary tract infection (HCC)  Active Problems:    Kidney stone    Primary hypertension    Mixed hyperlipidemia    Gastroesophageal reflux disease without esophagitis    Class 3 severe obesity with serious comorbidity and body mass index (BMI) of 40.0 to 44.9 in adult    Acute cystitis without hematuria  Resolved Problems:    * No resolved hospital problems. *      Plan:  1/18/2025  Sepsis  UTI  - IVF  - Rocephin  - f/u culture results  - urology c/s from ED     HTN  HLD  - continue home BP meds with hold parameters  - continue home statin       1/18/2025  Sepsis due to UTI on empiric Rocephin follow cultures  Nephrolithiasis urology consult.  Monitor daily BMP  Hypertension: Reasonably controlled on home medications.  Adjust as needed.   Continue to monitor.  Hyperlipidemia statin  DVT prophylaxis: SCDs add Lovenox when okay by  Full code.  Disposition: Home likely in 2 days    1/19/2025  History of present illness from History and Physical:  74 y.o. male with a history of HTN, HLD presents with fever.  Reports he was in his usual state of health when yesterday he developed fever and felt \"in a fog.\"  Had associated chills. Workup in ED significant for glucose 159, lactic acid 2.8, wbc 13.9.  UA nitrite and leukocyte esterase positive.  CT abd/pelvis bilateral nephrolithiasis, right ureteral stent with 4mm calculus.  Patient was admitted 1/1-1/3 with nephrolithiasis, had ureteral stent placed.  Plan for lithotripsy on 1/20.  Given tylenol, rocephin, 2L bolus in ED.       Sepsis due to UTI on empiric Rocephin follow cultures which started to grow e coli.  Sn

## 2025-01-22 NOTE — PROGRESS NOTES
Physician Progress Note      PATIENT:               ACACIA WESTBROOK  [facility] #:                  [id number]  :                       1950  ADMIT DATE:       2025 8:03 PM  DISCH DATE:  RESPONDING  PROVIDER #:        Jodi Garcia DO          QUERY TEXT:    Pt admitted with UTI.  Pt noted to be S/P right ureteral stent. If possible,   please document in the progress notes and discharge summary if you are   evaluating and/or treating any of the following:    The medical record reflects the following:  Risk Factors:  Sepsis, UTI, Obesity. HTN  Clinical Indicators: CT abdomen/pelvis bilateral nephrolithiasis right   ureteral stent with 4 mm calculus. S/P right ureteral stent.  Sepsis. WBC 13.9, Lactic acid 2.8. UA 3+ BACTERIA., Pulse 111, 118, T3mp   103.4. 101.8  Treatment: Urology consult, cystoscopy right ureteroscopy and laser   lithotripsy and right stent exchange pending. Rocephin 1 g, IVPB, labs.    Thank you  Flora Perez RN, BSN, [facility]  [phone]  Options provided:  -- UTI due to ureteral stent  -- UTI not due to ureteral stent  -- Other - I will add my own diagnosis  -- Disagree - Not applicable / Not valid  -- Disagree - Clinically unable to determine / Unknown  -- Refer to Clinical Documentation Reviewer    PROVIDER RESPONSE TEXT:    UTI is not due to ureteral stent.    Query created by: Flora Perez on 2025 2:23 PM      Electronically signed by:  Jodi Garcia DO 2025 12:00 PM

## 2025-01-22 NOTE — ANESTHESIA PRE PROCEDURE
650 mg  650 mg Rectal Q6H PRN Fazal Rosas MD           Allergies:    Allergies   Allergen Reactions    Ace Inhibitors        Problem List:    Patient Active Problem List   Diagnosis Code    Kidney stone N20.0    Primary hypertension I10    Mixed hyperlipidemia E78.2    Gastroesophageal reflux disease without esophagitis K21.9    Class 3 severe obesity with serious comorbidity and body mass index (BMI) of 40.0 to 44.9 in adult E66.813, Z68.41, E66.01    Acute cystitis without hematuria N30.00    Sepsis due to urinary tract infection (HCC) A41.9, N39.0       Past Medical History:        Diagnosis Date    GERD (gastroesophageal reflux disease)     Hyperlipidemia     Hypertension     Kidney stone        Past Surgical History:        Procedure Laterality Date    COLONOSCOPY      DEBRIDEMENT Right 01/02/2025    CYSTOSCOPY BILATERAL RETROGRADE PYELOGRAM RIGHT STENT INSERTION performed by Igor Montoya MD at [facility]    ENDOSCOPY, COLON, DIAGNOSTIC      HERNIA REPAIR      TONSILLECTOMY      TOTAL KNEE ARTHROPLASTY Right     WISDOM TOOTH EXTRACTION         Social History:    Social History     Tobacco Use    Smoking status: Never    Smokeless tobacco: Never   Substance Use Topics    Alcohol use: Not Currently                                Counseling given: Not Answered      Vital Signs (Current):   Vitals:    01/21/25 1924 01/21/25 2344 01/22/25 0640 01/22/25 0957   BP: 103/68 107/70 110/69 132/88   Pulse: 88 91 86 90   Resp: 18 18     Temp: 98.6 °F (37 °C) 97.9 °F (36.6 °C) 99.3 °F (37.4 °C)    TempSrc: Oral Oral     SpO2: 95% 95% 93%    Weight:       Height:                                                  BP Readings from Last 3 Encounters:   01/22/25 132/88   01/03/25 132/77   11/10/22 (!) 147/90       NPO Status:                                                                                 BMI:   Wt Readings from Last 3 Encounters:   01/19/25 129.1 kg (284 lb 9.6 oz)   01/01/25 (!) 138.8 kg (306 lb)

## 2025-01-23 VITALS
OXYGEN SATURATION: 95 % | DIASTOLIC BLOOD PRESSURE: 77 MMHG | HEART RATE: 96 BPM | TEMPERATURE: 97.8 F | SYSTOLIC BLOOD PRESSURE: 113 MMHG | RESPIRATION RATE: 18 BRPM | WEIGHT: 284.6 LBS | HEIGHT: 73 IN | BODY MASS INDEX: 37.72 KG/M2

## 2025-01-23 LAB
ANION GAP SERPL CALCULATED.3IONS-SCNC: 11 MMOL/L (ref 7–16)
BASOPHILS # BLD: 0.06 K/UL (ref 0–0.2)
BASOPHILS NFR BLD: 1 % (ref 0–2)
BUN SERPL-MCNC: 13 MG/DL (ref 6–23)
CALCIUM SERPL-MCNC: 9.4 MG/DL (ref 8.6–10.2)
CHLORIDE SERPL-SCNC: 101 MMOL/L (ref 98–107)
CO2 SERPL-SCNC: 24 MMOL/L (ref 22–29)
CREAT SERPL-MCNC: 1.1 MG/DL (ref 0.7–1.2)
EOSINOPHIL # BLD: 0.34 K/UL (ref 0.05–0.5)
EOSINOPHILS RELATIVE PERCENT: 6 % (ref 0–6)
ERYTHROCYTE [DISTWIDTH] IN BLOOD BY AUTOMATED COUNT: 13.7 % (ref 11.5–15)
GFR, ESTIMATED: 73 ML/MIN/1.73M2
GLUCOSE BLD-MCNC: 126 MG/DL (ref 74–99)
GLUCOSE SERPL-MCNC: 134 MG/DL (ref 74–99)
HCT VFR BLD AUTO: 38.3 % (ref 37–54)
HGB BLD-MCNC: 13.2 G/DL (ref 12.5–16.5)
IMM GRANULOCYTES # BLD AUTO: 0.06 K/UL (ref 0–0.58)
IMM GRANULOCYTES NFR BLD: 1 % (ref 0–5)
LYMPHOCYTES NFR BLD: 1.75 K/UL (ref 1.5–4)
LYMPHOCYTES RELATIVE PERCENT: 28 % (ref 20–42)
MCH RBC QN AUTO: 31 PG (ref 26–35)
MCHC RBC AUTO-ENTMCNC: 34.5 G/DL (ref 32–34.5)
MCV RBC AUTO: 89.9 FL (ref 80–99.9)
MICROORGANISM SPEC CULT: NORMAL
MICROORGANISM SPEC CULT: NORMAL
MONOCYTES NFR BLD: 0.71 K/UL (ref 0.1–0.95)
MONOCYTES NFR BLD: 11 % (ref 2–12)
NEUTROPHILS NFR BLD: 53 % (ref 43–80)
NEUTS SEG NFR BLD: 3.3 K/UL (ref 1.8–7.3)
PLATELET # BLD AUTO: 265 K/UL (ref 130–450)
PMV BLD AUTO: 8.4 FL (ref 7–12)
POTASSIUM SERPL-SCNC: 3.7 MMOL/L (ref 3.5–5)
RBC # BLD AUTO: 4.26 M/UL (ref 3.8–5.8)
SERVICE CMNT-IMP: NORMAL
SERVICE CMNT-IMP: NORMAL
SODIUM SERPL-SCNC: 136 MMOL/L (ref 132–146)
SPECIMEN DESCRIPTION: NORMAL
SPECIMEN DESCRIPTION: NORMAL
WBC OTHER # BLD: 6.2 K/UL (ref 4.5–11.5)

## 2025-01-23 PROCEDURE — 6370000000 HC RX 637 (ALT 250 FOR IP): Performed by: FAMILY MEDICINE

## 2025-01-23 PROCEDURE — 85025 COMPLETE CBC W/AUTO DIFF WBC: CPT

## 2025-01-23 PROCEDURE — 80048 BASIC METABOLIC PNL TOTAL CA: CPT

## 2025-01-23 PROCEDURE — 36415 COLL VENOUS BLD VENIPUNCTURE: CPT

## 2025-01-23 PROCEDURE — 99239 HOSP IP/OBS DSCHRG MGMT >30: CPT | Performed by: FAMILY MEDICINE

## 2025-01-23 PROCEDURE — 82962 GLUCOSE BLOOD TEST: CPT

## 2025-01-23 RX ORDER — HYDROXYZINE PAMOATE 50 MG/1
50 CAPSULE ORAL NIGHTLY PRN
Qty: 30 CAPSULE | Refills: 0 | Status: SHIPPED | OUTPATIENT
Start: 2025-01-23 | End: 2025-02-22

## 2025-01-23 RX ORDER — SULFAMETHOXAZOLE AND TRIMETHOPRIM 800; 160 MG/1; MG/1
1 TABLET ORAL 2 TIMES DAILY
Qty: 28 TABLET | Refills: 0 | Status: SHIPPED | OUTPATIENT
Start: 2025-01-23 | End: 2025-02-06

## 2025-01-23 RX ORDER — BLOOD-GLUCOSE METER
1 KIT MISCELLANEOUS 4 TIMES DAILY
Qty: 1 KIT | Refills: 0 | Status: SHIPPED | OUTPATIENT
Start: 2025-01-23

## 2025-01-23 RX ORDER — BLOOD SUGAR DIAGNOSTIC
1 STRIP MISCELLANEOUS 4 TIMES DAILY
Qty: 120 EACH | Refills: 5 | Status: SHIPPED | OUTPATIENT
Start: 2025-01-23

## 2025-01-23 RX ORDER — LANCETS 30 GAUGE
1 EACH MISCELLANEOUS 4 TIMES DAILY
Qty: 120 EACH | Refills: 5 | Status: SHIPPED | OUTPATIENT
Start: 2025-01-23

## 2025-01-23 RX ADMIN — INSULIN GLARGINE 15 UNITS: 100 INJECTION, SOLUTION SUBCUTANEOUS at 10:00

## 2025-01-23 RX ADMIN — PANTOPRAZOLE SODIUM 40 MG: 40 TABLET, DELAYED RELEASE ORAL at 05:28

## 2025-01-23 RX ADMIN — BREXPIPRAZOLE 2 MG: 2 TABLET ORAL at 09:59

## 2025-01-23 RX ADMIN — ROSUVASTATIN CALCIUM 40 MG: 20 TABLET, FILM COATED ORAL at 09:59

## 2025-01-23 RX ADMIN — TIZANIDINE 4 MG: 4 TABLET ORAL at 10:00

## 2025-01-23 RX ADMIN — PROPRANOLOL HYDROCHLORIDE 60 MG: 40 TABLET ORAL at 09:59

## 2025-01-23 RX ADMIN — AMLODIPINE BESYLATE 10 MG: 5 TABLET ORAL at 09:59

## 2025-01-23 RX ADMIN — TAMSULOSIN HYDROCHLORIDE 0.4 MG: 0.4 CAPSULE ORAL at 09:59

## 2025-01-23 RX ADMIN — PREGABALIN 100 MG: 100 CAPSULE ORAL at 09:59

## 2025-01-23 RX ADMIN — LOSARTAN POTASSIUM 100 MG: 50 TABLET, FILM COATED ORAL at 09:59

## 2025-01-23 RX ADMIN — DULOXETINE HYDROCHLORIDE 60 MG: 60 CAPSULE, DELAYED RELEASE ORAL at 09:59

## 2025-01-23 NOTE — DISCHARGE SUMMARY
medications      AMLODIPINE BENZOATE PO     aspirin 81 MG chewable tablet     brexpiprazole 2 MG Tabs tablet  Commonly known as: REXULTI     DULoxetine 30 MG extended release capsule  Commonly known as: CYMBALTA     hydroCHLOROthiazide 12.5 MG capsule     ibuprofen 800 MG tablet  Commonly known as: ADVIL;MOTRIN     linaclotide 145 MCG capsule  Commonly known as: LINZESS     LOSARTAN POTASSIUM PO     metFORMIN 500 MG tablet  Commonly known as: GLUCOPHAGE     Mounjaro 15 MG/0.5ML Soaj  Generic drug: Tirzepatide     omeprazole 20 MG delayed release capsule  Commonly known as: PRILOSEC     oxyCODONE-acetaminophen  MG per tablet  Commonly known as: PERCOCET     pregabalin 100 MG capsule  Commonly known as: LYRICA     primidone 50 MG tablet  Commonly known as: MYSOLINE     propranolol 10 MG tablet  Commonly known as: INDERAL     Rosuvastatin Calcium 40 MG Cpsp     tadalafil 5 MG tablet  Commonly known as: CIALIS     tamsulosin 0.4 MG capsule  Commonly known as: FLOMAX     tiZANidine 2 MG tablet  Commonly known as: ZANAFLEX     traZODone 100 MG tablet  Commonly known as: DESYREL               Where to Get Your Medications        These medications were sent to [facility]/pharmacy #[id number] - [place], [place] - [address] - P [phone] - F [phone] [address] [address]      Phone: [phone]   hydrOXYzine pamoate 50 MG capsule  ONE TOUCH ULTRA MINI w/Device Kit  OneTouch Delica Plus Cglrao18M Misc  OneTouch Ultra strip  sulfamethoxazole-trimethoprim 800-160 MG per tablet         Note that greater than 30 minutes was spent in preparing discharge papers, discussing discharge with patient, medication review, etc.      Signed:  Electronically signed by Jodi Garcia DO on 1/23/2025 at 7:13 AM

## 2025-01-23 NOTE — PROGRESS NOTES
Spiritual Health History and Assessment/Progress Note  [facility]    (P) Spiritual/Emotional Needs,  ,  ,      Name: Brian Patel MRN: [id number]    Age: 74 y.o.     Sex: male   Language: English   Yarsani: None   Sepsis due to urinary tract infection (HCC)     Date: 1/23/2025                           Spiritual Assessment began in [facility] PIC/ICU        Referral/Consult From: (P) Rounding   Encounter Overview/Reason: (P) Spiritual/Emotional Needs  Service Provided For: (P) Patient    Kacey, Belief, Meaning:   Patient identifies as spiritual  Family/Friends No family/friends present      Importance and Influence:  Patient has spiritual/personal beliefs that influence decisions regarding their health  Family/Friends No family/friends present    Community:  Patient feels well-supported. Support system includes: Spouse/Partner  Family/Friends No family/friends present    Assessment and Plan of Care:     Patient Interventions include: Facilitated expression of thoughts and feelings  Family/Friends Interventions include: No family/friends present    Patient Plan of Care: Spiritual Care available upon further referral  Family/Friends Plan of Care: No family/friends present    Electronically signed by Chaplain Kayla on 1/23/2025 at 2:02 PM

## 2025-01-29 ENCOUNTER — HOSPITAL ENCOUNTER (OUTPATIENT)
Age: 75
Discharge: HOME | End: 2025-01-31

## 2025-01-29 PROCEDURE — 82365 CALCULUS SPECTROSCOPY: CPT

## 2025-02-03 LAB
STONE COMPOSITION: NORMAL
STONE DESCRIPTION: NORMAL
STONE MASS: 135 MG

## 2025-02-04 LAB — SURGICAL PATHOLOGY REPORT: NORMAL

## (undated) PROCEDURE — BT141ZZ FLUOROSCOPY OF KIDNEYS, URETERS AND BLADDER USING LOW OSMOLAR CONTRAST: ICD-10-PCS

## (undated) PROCEDURE — 0T768DZ DILATION OF RIGHT URETER WITH INTRALUMINAL DEVICE, VIA NATURAL OR ARTIFICIAL OPENING ENDOSCOPIC: ICD-10-PCS

## (undated) DEVICE — TUBING, SUCTION, 1/4" X 10', STRAIGHT: Brand: MEDLINE

## (undated) DEVICE — BAG DRNGE COMB PK

## (undated) DEVICE — WIPES SKIN CLOTH READYPREP 9 X 10.5 IN 2% CHLORHEX GLUCONATE CHG PREOP

## (undated) DEVICE — TOWEL,OR,DSP,ST,BLUE,STD,6/PK,12PK/CS: Brand: MEDLINE

## (undated) DEVICE — GLOVE ORANGE PI 7 1/2   MSG9075

## (undated) DEVICE — BAG DRAINAGE CONTAINER 15 LT FLUID COLLCTN

## (undated) DEVICE — BASIC SINGLE BASIN 1-LF: Brand: MEDLINE INDUSTRIES, INC.

## (undated) DEVICE — SOLUTION IRRIG 3000ML STRL H2O USP UROMATIC PLAS CONT

## (undated) DEVICE — CATHETER URET 5FR L70CM OPN END SGL LUMN INJ HUB FLEXIMA

## (undated) DEVICE — GARMENT,MEDLINE,DVT,INT,CALF,LG, GEN2: Brand: MEDLINE

## (undated) DEVICE — GOWN,SIRUS,NONRNF,SETINSLV,XL,20/CS: Brand: MEDLINE

## (undated) DEVICE — MEDIA CONTRAST ISOVUE 300 100ML

## (undated) DEVICE — HOSE CONN FOR WST MGMT SYS NEPTUNE 2

## (undated) DEVICE — GOWN,SIRUS,FABRNF,XL,20/CS: Brand: MEDLINE

## (undated) DEVICE — GUIDEWIRE UROLOGICAL STR STD 0.035 IN X150 CM (QTY = BOX OF 5)

## (undated) DEVICE — 4-PORT MANIFOLD: Brand: NEPTUNE 2

## (undated) DEVICE — GARMENT,MEDLINE,DVT,INT,CALF,MED, GEN2: Brand: MEDLINE

## (undated) DEVICE — GUIDEWIRE ENDOSCP L150CM DIA0.035IN TIP 3CM PTFE NIT

## (undated) DEVICE — CYSTO PACK: Brand: MEDLINE INDUSTRIES, INC.

## (undated) DEVICE — GLOVE ORANGE PI 7   MSG9070

## (undated) DEVICE — CYSTO: Brand: MEDLINE INDUSTRIES, INC.

## (undated) DEVICE — SOLUTION IRRIG 1000ML STRL H2O USP PLAS POUR BTL

## (undated) DEVICE — SYRINGE MED 10ML LUERLOCK TIP W/O SFTY DISP

## (undated) DEVICE — GAUZE,SPONGE,4"X4",16PLY,XRAY,STRL,LF: Brand: MEDLINE

## (undated) DEVICE — SYRINGE MED 20ML STD CLR PLAS LUERLOCK TIP N CTRL DISP